# Patient Record
Sex: FEMALE | Race: WHITE | Employment: FULL TIME | URBAN - METROPOLITAN AREA
[De-identification: names, ages, dates, MRNs, and addresses within clinical notes are randomized per-mention and may not be internally consistent; named-entity substitution may affect disease eponyms.]

---

## 2017-02-15 ENCOUNTER — OFFICE VISIT (OUTPATIENT)
Dept: INTERNAL MEDICINE CLINIC | Age: 32
End: 2017-02-15

## 2017-02-15 VITALS
SYSTOLIC BLOOD PRESSURE: 123 MMHG | WEIGHT: 151 LBS | BODY MASS INDEX: 26.75 KG/M2 | DIASTOLIC BLOOD PRESSURE: 65 MMHG | OXYGEN SATURATION: 98 % | HEART RATE: 86 BPM | RESPIRATION RATE: 16 BRPM | TEMPERATURE: 99.3 F | HEIGHT: 63 IN

## 2017-02-15 DIAGNOSIS — J02.0 STREP THROAT: ICD-10-CM

## 2017-02-15 DIAGNOSIS — R09.81 SINUS CONGESTION: ICD-10-CM

## 2017-02-15 DIAGNOSIS — J02.9 SORE THROAT: Primary | ICD-10-CM

## 2017-02-15 LAB
S PYO AG THROAT QL: POSITIVE
VALID INTERNAL CONTROL?: YES

## 2017-02-15 RX ORDER — AMOXICILLIN 875 MG/1
875 TABLET, FILM COATED ORAL 2 TIMES DAILY
Qty: 20 TAB | Refills: 0 | Status: SHIPPED | OUTPATIENT
Start: 2017-02-15 | End: 2017-05-04 | Stop reason: ALTCHOICE

## 2017-02-15 RX ORDER — PSEUDOEPHEDRINE HCL 30 MG
30 TABLET ORAL
Qty: 30 TAB | Refills: 0 | Status: SHIPPED | OUTPATIENT
Start: 2017-02-15 | End: 2017-05-04 | Stop reason: ALTCHOICE

## 2017-02-15 NOTE — LETTER
NOTIFICATION RETURN TO WORK / SCHOOL 
 
2/15/2017 10:10 AM 
 
Ms. Foreman [address] [address] To Whom It May Concern: 
 
Sudha Hauser is currently under the care of [address] [place] [address],[address]. Was seen in office today and diagnosed with strep throat and is considered contagious until she is on antibiotic therapy for 24 hours. She will return to work/school on: 2/16/2017 If there are questions or concerns please have the patient contact our office.  
 
 
 
Sincerely, 
 
 
Galo Tapia NP

## 2017-02-15 NOTE — PATIENT INSTRUCTIONS
Strep Throat: Care Instructions  Your Care Instructions    Strep throat is a bacterial infection that causes sudden, severe sore throat and fever. Strep throat, which is caused by bacteria called streptococcus, is treated with antibiotics. Sometimes a strep test is necessary to tell if the sore throat is caused by strep bacteria. Treatment can help ease symptoms and may prevent future problems. Follow-up care is a key part of your treatment and safety. Be sure to make and go to all appointments, and call your doctor if you are having problems. It's also a good idea to know your test results and keep a list of the medicines you take. How can you care for yourself at home? · Take your antibiotics as directed. Do not stop taking them just because you feel better. You need to take the full course of antibiotics. · Strep throat can spread to others until 24 hours after you begin taking antibiotics. During this time, you should avoid contact with other people at work or home, especially infants and children. Do not sneeze or cough on others, and wash your hands often. Keep your drinking glass and eating utensils separate from those of others, and wash these items well in hot, soapy water. · Gargle with warm salt water at least once each hour to help reduce swelling and make your throat feel better. Use 1 teaspoon of salt mixed in 8 fluid ounces of warm water. · Take an over-the-counter pain medication, such as acetaminophen (Tylenol), ibuprofen (Advil, Motrin), or naproxen (Aleve). Read and follow all instructions on the label. · Try an over-the-counter anesthetic throat spray or throat lozenges, which may help relieve throat pain. · Drink plenty of fluids. Fluids may help soothe an irritated throat. Hot fluids, such as tea or soup, may help your throat feel better. · Eat soft solids and drink plenty of clear liquids.  Flavored ice pops, ice cream, scrambled eggs, sherbet, and gelatin dessert (such as Jell-O) may also soothe the throat. · Get lots of rest.  · Do not smoke, and avoid secondhand smoke. If you need help quitting, talk to your doctor about stop-smoking programs and medicines. These can increase your chances of quitting for good. · Use a vaporizer or humidifier to add moisture to the air in your bedroom. Follow the directions for cleaning the machine. When should you call for help? Call your doctor now or seek immediate medical care if:  · You have a new or higher fever. · You have a fever with a stiff neck or severe headache. · You have new or worse trouble swallowing. · Your sore throat gets much worse on one side. · Your pain becomes much worse on one side of your throat. Watch closely for changes in your health, and be sure to contact your doctor if:  · You are not getting better after 2 days (48 hours). · You do not get better as expected. Where can you learn more? Go to http://balbir-tosha.info/. Enter K625 in the search box to learn more about \"Strep Throat: Care Instructions. \"  Current as of: July 29, 2016  Content Version: 11.1  © 6516-5165 [employer]. Care instructions adapted under license by [employer] (which disclaims liability or warranty for this information). If you have questions about a medical condition or this instruction, always ask your healthcare professional. [address] any warranty or liability for your use of this information. Managing Your Allergies: Care Instructions  Your Care Instructions  Managing your allergies is an important part of staying healthy. Your doctor will help you find out what may be causing the allergies. Common causes of allergy symptoms are house dust and dust mites, animal dander, mold, and pollen. As soon as you know what triggers your symptoms, try to reduce your exposure to your triggers. This can help prevent allergy symptoms, asthma, and other health problems.   Ask your doctor about allergy medicine or immunotherapy. These treatments may help reduce or prevent allergy symptoms. Follow-up care is a key part of your treatment and safety. Be sure to make and go to all appointments, and call your doctor if you are having problems. It's also a good idea to know your test results and keep a list of the medicines you take. How can you care for yourself at home? · If you think that dust or dust mites are causing your allergies:  ¨ Wash sheets, pillowcases, and other bedding every week in hot water. ¨ Use airtight, dust-proof covers for pillows, duvets, and mattresses. Avoid plastic covers, because they tend to tear quickly and do not \"breathe. \" Wash according to the instructions. ¨ Remove extra blankets and pillows that you don't need. ¨ Use blankets that are machine-washable. ¨ Don't use home humidifiers. They can help mites live longer. · Use air-conditioning. Change or clean all filters every month. Keep windows closed. Use high-efficiency air filters. Don't use window or attic fans, which draw dust into the air. · If you're allergic to pet dander, keep pets outside or, at the very least, out of your bedroom. Old carpet and cloth-covered furniture can hold a lot of animal dander. You may need to replace them. · Look for signs of cockroaches. Use cockroach baits to get rid of them. Then clean your home well. · If you're allergic to mold, don't keep indoor plants, because molds can grow in soil. Get rid of furniture, rugs, and drapes that smell musty. Check for mold in the bathroom. · If you're allergic to pollen, stay inside when pollen counts are high. · Don't smoke or let anyone else smoke in your house. Don't use fireplaces or wood-burning stoves. Avoid paint fumes, perfumes, and other strong odors. When should you call for help? Give an epinephrine shot if:  · You think you are having a severe allergic reaction.   · You have symptoms in more than one body area, such as mild nausea and an itchy mouth. After giving an epinephrine shot call 911, even if you feel better. Call 911 if:  · You have symptoms of a severe allergic reaction. These may include:  ¨ Sudden raised, red areas (hives) all over your body. ¨ Swelling of the throat, mouth, lips, or tongue. ¨ Trouble breathing. ¨ Passing out (losing consciousness). Or you may feel very lightheaded or suddenly feel weak, confused, or restless. · You have been given an epinephrine shot, even if you feel better. Call your doctor now or seek immediate medical care if:  · You have symptoms of an allergic reaction, such as:  ¨ A rash or hives (raised, red areas on the skin). ¨ Itching. ¨ Swelling. ¨ Belly pain, nausea, or vomiting. Watch closely for changes in your health, and be sure to contact your doctor if:  · Your allergies get worse. · You need help controlling your allergies. · You have questions about allergy testing. · You do not get better as expected. Where can you learn more? Go to http://balbir-tosha.info/. Enter L249 in the search box to learn more about \"Managing Your Allergies: Care Instructions. \"  Current as of: February 12, 2016  Content Version: 11.1  © 3343-9404 [employer]. Care instructions adapted under license by [employer] (which disclaims liability or warranty for this information). If you have questions about a medical condition or this instruction, always ask your healthcare professional. [place] [address] any warranty or liability for your use of this information.

## 2017-02-15 NOTE — PROGRESS NOTES
Have you been to the ER or urgent care clinic since your last visit? No     Have you been hospitalized since your last visit? No     Have you been seen or consulted any other health care provider outside of [address] [place] [address] since your last visit (including pap smears, colonoscopy screening)?   No

## 2017-02-15 NOTE — PROGRESS NOTES
HISTORY OF PRESENT ILLNESS  Rubi Barroso is a 27 y.o. female. HPI  Presents with complaints of severe sore throat and fever for 2 days. Has had sinus congestion over the past week but denies purulent mucous, sinus pain, thick post nasal drainage. Has had some dry cough but denies shortness of breath or wheezing. She is weaning her 10 month old daughter from breast feeding. Her  tested positive for strep several days ago. Review of Systems   Constitutional: Positive for chills, fever and malaise/fatigue. HENT: Positive for congestion and sore throat. Respiratory: Positive for cough. Negative for sputum production and shortness of breath. Cardiovascular: Negative for chest pain and palpitations. Gastrointestinal: Negative for nausea and vomiting. Skin: Negative for rash. Neurological: Positive for headaches. Negative for dizziness and tingling. /65 (BP 1 Location: Left arm, BP Patient Position: Sitting)  Pulse 86  Temp 99.3 °F (37.4 °C) (Oral)   Resp 16  Ht 5' 3\" (1.6 m)  Wt 151 lb (68.5 kg)  SpO2 98%  BMI 26.75 kg/m2  Physical Exam   Constitutional: She is oriented to person, place, and time. She appears well-developed and well-nourished. HENT:   Head: Normocephalic and atraumatic. Right Ear: External ear normal.   Left Ear: External ear normal.   Nose: Nose normal.   Mouth/Throat: Posterior oropharyngeal edema and posterior oropharyngeal erythema present. Neck: Normal range of motion. Neck supple. No thyromegaly present. Cardiovascular: Normal rate and regular rhythm. Pulmonary/Chest: Effort normal and breath sounds normal. She has no wheezes. She has no rales. Lymphadenopathy:     She has cervical adenopathy. Neurological: She is alert and oriented to person, place, and time. Psychiatric: She has a normal mood and affect. Her behavior is normal.   Nursing note and vitals reviewed. ASSESSMENT and PLAN  Sherri Estes was seen today for sore throat.     Diagnoses and all orders for this visit:    Sore throat  -     AMB POC RAPID STREP A -- positive  -     benzocaine-menthol (CEPACOL SORE THROAT, CARLOS-MEN,) 15-3.6 mg lozg lozenge; 1 Lozenge by Mucous Membrane route as needed. Strep throat  -     amoxicillin (AMOXIL) 875 mg tablet; Take 1 Tab by mouth two (2) times a day. Sinus congestion  -     pseudoephedrine (SUDAFED) 30 mg tablet; Take 1 Tab by mouth every six (6) hours as needed for Congestion.       lab results and schedule of future lab studies reviewed with patient  reviewed diet, exercise and weight control  reviewed medications and side effects in detail

## 2017-02-15 NOTE — MR AVS SNAPSHOT
Visit Information Date & Time Provider Department Dept. Phone Encounter #  
 2/15/2017  9:40 AM Roslyn Kumar NP [facility] [address] [id number] Upcoming Health Maintenance Date Due DTaP/Tdap/Td series (1 - Tdap) 2/3/2004 PAP AKA CERVICAL CYTOLOGY 2/1/2016 INFLUENZA AGE 9 TO ADULT 8/1/2016 Allergies as of 2/15/2017  Review Complete On: 2/15/2017 By: Roslyn Kumar NP No Known Allergies Current Immunizations  Reviewed on 12/4/2015 Name Date Influenza Vaccine 10/15/2015 Not reviewed this visit You Were Diagnosed With   
  
 Codes Comments Sore throat    -  Primary ICD-10-CM: J02.9 ICD-9-CM: 497 Strep throat     ICD-10-CM: J02.0 ICD-9-CM: 034.0 Sinus congestion     ICD-10-CM: R09.81 ICD-9-CM: 478.19 Vitals BP Pulse Temp Resp Height(growth percentile) Weight(growth percentile) 123/65 (BP 1 Location: Left arm, BP Patient Position: Sitting) 86 99.3 °F (37.4 °C) (Oral) 16 5' 3\" (1.6 m) 151 lb (68.5 kg) SpO2 BMI OB Status Smoking Status 98% 26.75 kg/m2 Recent pregnancy Never Smoker BMI and BSA Data Body Mass Index Body Surface Area  
 26.75 kg/m 2 1.74 m 2 Preferred Pharmacy Pharmacy Name Phone [facility] [address] [place] [address] [phone] Your Updated Medication List  
  
   
This list is accurate as of: 2/15/17 10:12 AM.  Always use your most recent med list.  
  
  
  
  
 amoxicillin 875 mg tablet Commonly known as:  AMOXIL Take 1 Tab by mouth two (2) times a day. benzocaine-menthol 15-3.6 mg Lozg lozenge Commonly known as:  CEPACOL SORE THROAT (CARLOS-MEN) 1 Lozenge by Mucous Membrane route as needed. COLACE 50 mg capsule Generic drug:  docusate sodium Take 50 mg by mouth two (2) times a day. levonorgestrel 20 mcg/24 hr (5 years) IUD Commonly known as:  MIRENA  
 1 Each by IntraUTERine route once. PRENATAL DHA+COMPLETE PRENATAL -300 mg-mcg-mg Cmpk Generic drug:  PNV no.24-iron-folic acid-dha Take  by mouth.  
  
 pseudoephedrine 30 mg tablet Commonly known as:  SUDAFED Take 1 Tab by mouth every six (6) hours as needed for Congestion. Prescriptions Sent to Pharmacy Refills  
 amoxicillin (AMOXIL) 875 mg tablet 0 Sig: Take 1 Tab by mouth two (2) times a day. Class: Normal  
 Pharmacy: [address] [place] [address] Ph #: [phone] Route: Oral  
 pseudoephedrine (SUDAFED) 30 mg tablet 0 Sig: Take 1 Tab by mouth every six (6) hours as needed for Congestion. Class: Normal  
 Pharmacy: [address] Ph #: [phone] Route: Oral  
  
We Performed the Following AMB POC RAPID STREP A [68966 CPT(R)] Patient Instructions Strep Throat: Care Instructions Your Care Instructions Strep throat is a bacterial infection that causes sudden, severe sore throat and fever. Strep throat, which is caused by bacteria called streptococcus, is treated with antibiotics. Sometimes a strep test is necessary to tell if the sore throat is caused by strep bacteria. Treatment can help ease symptoms and may prevent future problems. Follow-up care is a key part of your treatment and safety. Be sure to make and go to all appointments, and call your doctor if you are having problems. It's also a good idea to know your test results and keep a list of the medicines you take. How can you care for yourself at home? · Take your antibiotics as directed. Do not stop taking them just because you feel better. You need to take the full course of antibiotics. · Strep throat can spread to others until 24 hours after you begin taking antibiotics.  During this time, you should avoid contact with other people at work or home, especially infants and children. Do not sneeze or cough on others, and wash your hands often. Keep your drinking glass and eating utensils separate from those of others, and wash these items well in hot, soapy water. · Gargle with warm salt water at least once each hour to help reduce swelling and make your throat feel better. Use 1 teaspoon of salt mixed in 8 fluid ounces of warm water. · Take an over-the-counter pain medication, such as acetaminophen (Tylenol), ibuprofen (Advil, Motrin), or naproxen (Aleve). Read and follow all instructions on the label. · Try an over-the-counter anesthetic throat spray or throat lozenges, which may help relieve throat pain. · Drink plenty of fluids. Fluids may help soothe an irritated throat. Hot fluids, such as tea or soup, may help your throat feel better. · Eat soft solids and drink plenty of clear liquids. Flavored ice pops, ice cream, scrambled eggs, sherbet, and gelatin dessert (such as Jell-O) may also soothe the throat. · Get lots of rest. 
· Do not smoke, and avoid secondhand smoke. If you need help quitting, talk to your doctor about stop-smoking programs and medicines. These can increase your chances of quitting for good. · Use a vaporizer or humidifier to add moisture to the air in your bedroom. Follow the directions for cleaning the machine. When should you call for help? Call your doctor now or seek immediate medical care if: 
· You have a new or higher fever. · You have a fever with a stiff neck or severe headache. · You have new or worse trouble swallowing. · Your sore throat gets much worse on one side. · Your pain becomes much worse on one side of your throat. Watch closely for changes in your health, and be sure to contact your doctor if: 
· You are not getting better after 2 days (48 hours). · You do not get better as expected. Where can you learn more? Go to http://balbir-tosha.info/. Enter K625 in the search box to learn more about \"Strep Throat: Care Instructions. \" Current as of: July 29, 2016 Content Version: 11.1 © 0670-3617 [employer]. Care instructions adapted under license by [employer] (which disclaims liability or warranty for this information). If you have questions about a medical condition or this instruction, always ask your healthcare professional. [address] any warranty or liability for your use of this information. Managing Your Allergies: Care Instructions Your Care Instructions Managing your allergies is an important part of staying healthy. Your doctor will help you find out what may be causing the allergies. Common causes of allergy symptoms are house dust and dust mites, animal dander, mold, and pollen. As soon as you know what triggers your symptoms, try to reduce your exposure to your triggers. This can help prevent allergy symptoms, asthma, and other health problems. Ask your doctor about allergy medicine or immunotherapy. These treatments may help reduce or prevent allergy symptoms. Follow-up care is a key part of your treatment and safety. Be sure to make and go to all appointments, and call your doctor if you are having problems. It's also a good idea to know your test results and keep a list of the medicines you take. How can you care for yourself at home? · If you think that dust or dust mites are causing your allergies: 
¨ Wash sheets, pillowcases, and other bedding every week in hot water. ¨ Use airtight, dust-proof covers for pillows, duvets, and mattresses. Avoid plastic covers, because they tend to tear quickly and do not \"breathe. \" Wash according to the instructions. ¨ Remove extra blankets and pillows that you don't need. ¨ Use blankets that are machine-washable. ¨ Don't use home humidifiers. They can help mites live longer. · Use air-conditioning. Change or clean all filters every month. Keep windows closed. Use high-efficiency air filters. Don't use window or attic fans, which draw dust into the air. · If you're allergic to pet dander, keep pets outside or, at the very least, out of your bedroom. Old carpet and cloth-covered furniture can hold a lot of animal dander. You may need to replace them. · Look for signs of cockroaches. Use cockroach baits to get rid of them. Then clean your home well. · If you're allergic to mold, don't keep indoor plants, because molds can grow in soil. Get rid of furniture, rugs, and drapes that smell musty. Check for mold in the bathroom. · If you're allergic to pollen, stay inside when pollen counts are high. · Don't smoke or let anyone else smoke in your house. Don't use fireplaces or wood-burning stoves. Avoid paint fumes, perfumes, and other strong odors. When should you call for help? Give an epinephrine shot if: 
· You think you are having a severe allergic reaction. · You have symptoms in more than one body area, such as mild nausea and an itchy mouth. After giving an epinephrine shot call 911, even if you feel better. Call 911 if: 
· You have symptoms of a severe allergic reaction. These may include: 
¨ Sudden raised, red areas (hives) all over your body. ¨ Swelling of the throat, mouth, lips, or tongue. ¨ Trouble breathing. ¨ Passing out (losing consciousness). Or you may feel very lightheaded or suddenly feel weak, confused, or restless. · You have been given an epinephrine shot, even if you feel better. Call your doctor now or seek immediate medical care if: 
· You have symptoms of an allergic reaction, such as: ¨ A rash or hives (raised, red areas on the skin). ¨ Itching. ¨ Swelling. ¨ Belly pain, nausea, or vomiting. Watch closely for changes in your health, and be sure to contact your doctor if: 
· Your allergies get worse. · You need help controlling your allergies. · You have questions about allergy testing. · You do not get better as expected. Where can you learn more? Go to http://balbir-tosha.info/. Enter L249 in the search box to learn more about \"Managing Your Allergies: Care Instructions. \" Current as of: February 12, 2016 Content Version: 11.1 © 2413-3940 [employer]. Care instructions adapted under license by [employer] (which disclaims liability or warranty for this information). If you have questions about a medical condition or this instruction, always ask your healthcare professional. [address] any warranty or liability for your use of this information. Introducing [facility]! Dear Anahy Barbosa: Thank you for requesting a Cavium account. Our records indicate that you already have an active Cavium account. You can access your account anytime at [email or web address] Did you know that you can access your hospital and ER discharge instructions at any time in Cavium? You can also review all of your test results from your hospital stay or ER visit. Additional Information If you have questions, please visit the Frequently Asked Questions section of the Cavium website at [email or web address] Remember, Cavium is NOT to be used for urgent needs. For medical emergencies, dial 911. Now available from your iPhone and Android! Please provide this summary of care documentation to your next provider. Your primary care clinician is listed as Joey De La Vega. If you have any questions after today's visit, please call [phone].

## 2017-05-04 ENCOUNTER — OFFICE VISIT (OUTPATIENT)
Dept: INTERNAL MEDICINE CLINIC | Age: 32
End: 2017-05-04

## 2017-05-04 VITALS
OXYGEN SATURATION: 98 % | TEMPERATURE: 98.5 F | DIASTOLIC BLOOD PRESSURE: 67 MMHG | HEART RATE: 59 BPM | RESPIRATION RATE: 18 BRPM | BODY MASS INDEX: 27.07 KG/M2 | HEIGHT: 63 IN | WEIGHT: 152.8 LBS | SYSTOLIC BLOOD PRESSURE: 98 MMHG

## 2017-05-04 DIAGNOSIS — J01.00 ACUTE MAXILLARY SINUSITIS, RECURRENCE NOT SPECIFIED: Primary | ICD-10-CM

## 2017-05-04 DIAGNOSIS — J30.1 SEASONAL ALLERGIC RHINITIS DUE TO POLLEN: ICD-10-CM

## 2017-05-04 DIAGNOSIS — G44.229 CHRONIC TENSION-TYPE HEADACHE, NOT INTRACTABLE: ICD-10-CM

## 2017-05-04 RX ORDER — FLUTICASONE PROPIONATE 50 MCG
2 SPRAY, SUSPENSION (ML) NASAL DAILY
Qty: 1 BOTTLE | Refills: 0
Start: 2017-05-04 | End: 2017-08-21

## 2017-05-04 RX ORDER — SUMATRIPTAN 50 MG/1
50 TABLET, FILM COATED ORAL
Qty: 12 TAB | Refills: 2 | Status: SHIPPED | OUTPATIENT
Start: 2017-05-04 | End: 2018-06-07 | Stop reason: SDUPTHER

## 2017-05-04 RX ORDER — CEFUROXIME AXETIL 500 MG/1
500 TABLET ORAL 2 TIMES DAILY
Qty: 20 TAB | Refills: 0 | Status: SHIPPED | OUTPATIENT
Start: 2017-05-04 | End: 2017-08-21

## 2017-05-04 NOTE — MR AVS SNAPSHOT
Visit Information Date & Time Provider Department Dept. Phone Encounter #  
 5/4/2017  2:00 PM Tash Orlando NP [facility] [address] [id number] Upcoming Health Maintenance Date Due DTaP/Tdap/Td series (1 - Tdap) 2/3/2004 PAP AKA CERVICAL CYTOLOGY 2/1/2016 INFLUENZA AGE 9 TO ADULT 8/1/2017 Allergies as of 5/4/2017  Review Complete On: 5/4/2017 By: Tash Orlando NP No Known Allergies Current Immunizations  Reviewed on 12/4/2015 Name Date Influenza Vaccine 10/15/2015 Not reviewed this visit You Were Diagnosed With   
  
 Codes Comments Acute maxillary sinusitis, recurrence not specified    -  Primary ICD-10-CM: J01.00 ICD-9-CM: 461.0 Chronic tension-type headache, not intractable     ICD-10-CM: B84.223 ICD-9-CM: 339.12 Seasonal allergic rhinitis due to pollen     ICD-10-CM: J30.1 ICD-9-CM: 477.0 Vitals BP Pulse Temp Resp Height(growth percentile) Weight(growth percentile) 98/67 (BP 1 Location: Left arm, BP Patient Position: Sitting) (!) 59 98.5 °F (36.9 °C) 18 5' 3\" (1.6 m) 152 lb 12.8 oz (69.3 kg) LMP SpO2 BMI OB Status Smoking Status 04/20/2017 (Exact Date) 98% 27.07 kg/m2 IUD Never Smoker Vitals History BMI and BSA Data Body Mass Index Body Surface Area  
 27.07 kg/m 2 1.76 m 2 Preferred Pharmacy Pharmacy Name Phone [facility] [address] [place] [address] [place] [facility] [phone] Your Updated Medication List  
  
   
This list is accurate as of: 5/4/17  2:38 PM.  Always use your most recent med list.  
  
  
  
  
 cefUROXime 500 mg tablet Commonly known as:  CEFTIN Take 1 Tab by mouth two (2) times a day. COLACE 50 mg capsule Generic drug:  docusate sodium Take 50 mg by mouth two (2) times a day. fluticasone 50 mcg/actuation nasal spray Commonly known as:  Kulwant Malcolm 2 Sprays by Both Nostrils route daily. levonorgestrel 20 mcg/24 hr (5 years) IUD Commonly known as:  MIRENA  
1 Each by IntraUTERine route once. PRENATAL DHA+COMPLETE PRENATAL -300 mg-mcg-mg Cmpk Generic drug:  PNV no.24-iron-folic acid-dha Take  by mouth. SUMAtriptan 50 mg tablet Commonly known as:  IMITREX Take 1 Tab by mouth once as needed for Migraine for up to 1 dose. May repeat in 2 hours with 2nd tablet; max 2 tabs in 24 hours Prescriptions Sent to Pharmacy Refills  
 cefUROXime (CEFTIN) 500 mg tablet 0 Sig: Take 1 Tab by mouth two (2) times a day. Class: Normal  
 Pharmacy: [address] [place] [address] Ph #: [phone] Route: Oral  
 SUMAtriptan (IMITREX) 50 mg tablet 2 Sig: Take 1 Tab by mouth once as needed for Migraine for up to 1 dose. May repeat in 2 hours with 2nd tablet; max 2 tabs in 24 hours Class: Normal  
 Pharmacy: [address] [place] [address] Ph #: [phone] Route: Oral  
  
Patient Instructions Seasonal Allergies: Care Instructions Your Care Instructions Allergies occur when your body's defense system (immune system) overreacts to certain substances. The immune system treats a harmless substance as if it were a harmful germ or virus. Many things can cause this to happen. Examples include pollens, medicine, food, dust, animal dander, and mold. Your allergies are seasonal if you have symptoms just at certain times of the year. In that case, you are probably allergic to pollens from certain trees, grasses, or weeds. Allergies can be mild or severe. Over-the-counter allergy medicine may help with some symptoms. Read and follow all instructions on the label. Managing your allergies is an important part of staying healthy. Your doctor may suggest that you have tests to help find the cause of your allergies.  When you know what things trigger your symptoms, you can avoid them. This can prevent allergy symptoms and other health problems. In some cases, immunotherapy might help. For this treatment, you get shots or use pills that have a small amount of certain allergens in them. Your body \"gets used to\" the allergen, so you react less to it over time. This kind of treatment may help prevent or reduce some allergy symptoms. Follow-up care is a key part of your treatment and safety. Be sure to make and go to all appointments, and call your doctor if you are having problems. It's also a good idea to know your test results and keep a list of the medicines you take. How can you care for yourself at home? · Be safe with medicines. Take your medicines exactly as prescribed. Call your doctor if you think you are having a problem with your medicine. · During your allergy season, keep windows closed. If you need to use air-conditioning, change or clean all filters every month. Take a shower and change your clothes after you have been outside. · Stay inside when pollen counts are high. Vacuum once or twice a week. Use a vacuum  with a HEPA filter or a double-thickness filter. When should you call for help? Call 911 anytime you think you may need emergency care. For example, call if: 
· You have symptoms of a severe allergic reaction. These may include: 
¨ Sudden raised, red areas (hives) all over your body. ¨ Swelling of the throat, mouth, lips, or tongue. ¨ Trouble breathing. ¨ Passing out (losing consciousness). Or you may feel very lightheaded or suddenly feel weak, confused, or restless. Watch closely for changes in your health, and be sure to contact your doctor if: 
· You need help controlling your allergies. · You have questions about allergy testing. · You do not get better as expected. Where can you learn more? Go to http://balbir-tosha.info/. Enter J912 in the search box to learn more about \"Seasonal Allergies: Care Instructions. \" 
 Current as of: February 12, 2016 Content Version: 11.2 © 9781-8929 [employer]. Care instructions adapted under license by [employer] (which disclaims liability or warranty for this information). If you have questions about a medical condition or this instruction, always ask your healthcare professional. [address] any warranty or liability for your use of this information. Sinusitis: Care Instructions Your Care Instructions Sinusitis is an infection of the lining of the sinus cavities in your head. Sinusitis often follows a cold. It causes pain and pressure in your head and face. In most cases, sinusitis gets better on its own in 1 to 2 weeks. But some mild symptoms may last for several weeks. Sometimes antibiotics are needed. Follow-up care is a key part of your treatment and safety. Be sure to make and go to all appointments, and call your doctor if you are having problems. It's also a good idea to know your test results and keep a list of the medicines you take. How can you care for yourself at home? · Take an over-the-counter pain medicine, such as acetaminophen (Tylenol), ibuprofen (Advil, Motrin), or naproxen (Aleve). Read and follow all instructions on the label. · If the doctor prescribed antibiotics, take them as directed. Do not stop taking them just because you feel better. You need to take the full course of antibiotics. · Be careful when taking over-the-counter cold or flu medicines and Tylenol at the same time. Many of these medicines have acetaminophen, which is Tylenol. Read the labels to make sure that you are not taking more than the recommended dose. Too much acetaminophen (Tylenol) can be harmful. · Breathe warm, moist air from a steamy shower, a hot bath, or a sink filled with hot water. Avoid cold, dry air. Using a humidifier in your home may help. Follow the directions for cleaning the machine. · Use saline (saltwater) nasal washes to help keep your nasal passages open and wash out mucus and bacteria. You can buy saline nose drops at a grocery store or drugstore. Or you can make your own at home by adding 1 teaspoon of salt and 1 teaspoon of baking soda to 2 cups of distilled water. If you make your own, fill a bulb syringe with the solution, insert the tip into your nostril, and squeeze gently. Shellie Conine your nose. · Put a hot, wet towel or a warm gel pack on your face 3 or 4 times a day for 5 to 10 minutes each time. · Try a decongestant nasal spray like oxymetazoline (Afrin). Do not use it for more than 3 days in a row. Using it for more than 3 days can make your congestion worse. When should you call for help? Call your doctor now or seek immediate medical care if: 
· You have new or worse swelling or redness in your face or around your eyes. · You have a new or higher fever. Watch closely for changes in your health, and be sure to contact your doctor if: 
· You have new or worse facial pain. · The mucus from your nose becomes thicker (like pus) or has new blood in it. · You are not getting better as expected. Where can you learn more? Go to http://balbir-tosha.info/. Enter D718 in the search box to learn more about \"Sinusitis: Care Instructions. \" Current as of: July 29, 2016 Content Version: 11.2 © 9491-8975 [employer]. Care instructions adapted under license by [employer] (which disclaims liability or warranty for this information). If you have questions about a medical condition or this instruction, always ask your healthcare professional. [place] [address] any warranty or liability for your use of this information. Recurring Migraine Headache: Care Instructions Your Care Instructions Migraines are painful, throbbing headaches.  They often start on one side of the head. They may cause nausea and vomiting and make you sensitive to light, sound, or smell. Some people may have only a few migraines throughout life. Others have them as often as several times a month. The goal of treatment is to reduce the number of migraines you have and relieve your symptoms. Even with treatment, you may continue to have migraines. You play an important role in dealing with your headaches. Work on avoiding things that seem to trigger your migraines. When you feel a headache coming on, act quickly to stop it before it gets worse. Follow-up care is a key part of your treatment and safety. Be sure to make and go to all appointments, and call your doctor if you are having problems. It's also a good idea to know your test results and keep a list of the medicines you take. How can you care for yourself at home? · Do not drive if you have taken a prescription pain medicine. · Rest in a quiet, dark room until your headache is gone. Close your eyes and try to relax or go to sleep. Do not watch TV or read. · Put a cold, moist cloth or cold pack on the painful area for 10 to 20 minutes at a time. Put a thin cloth between the cold pack and your skin. · Have someone gently massage your neck and shoulders. · Take your medicines exactly as prescribed. Call your doctor if you think you are having a problem with your medicine. You will get more details on the specific medicines your doctor prescribes. To prevent migraines · Keep a headache diary so you can figure out what triggers your headaches. Avoiding triggers may help you prevent headaches. Record when each headache began, how long it lasted, and what the pain was like. Use words like throbbing, aching, stabbing, or dull. Write down any other symptoms you had with the headache. These may include nausea, flashing lights or dark spots, or sensitivity to bright light or loud noise.  Note if the headache occurred near your period. List anything that might have triggered the headache. Triggers may include certain foods (chocolate, cheese, wine) or odors, smoke, bright light, stress, or lack of sleep. · If your doctor has prescribed medicine for your migraines, take it as directed. You may have medicine that you take only when you get a migraine and medicine that you take all the time to help prevent migraines. ¨ If your doctor has prescribed medicine for when you get a headache, take it at the first sign of a migraine, unless your doctor has given you other instructions. ¨ If your doctor has prescribed medicine to prevent migraines, take it exactly as prescribed. Call your doctor if you think you are having a problem with your medicine. · Find healthy ways to deal with stress. Migraines are most common during or right after stressful times. Take time to relax before and after you do something that has caused a migraine in the past. 
· Try to keep your muscles relaxed by keeping good posture. Check your jaw, face, neck, and shoulder muscles for tension. Try to relax them. When sitting at a desk, change positions often. Stretch for 30 seconds each hour. · Get regular sleep and exercise. · Eat regular meals, and avoid foods and drinks that often trigger migraines. These include chocolate and alcohol, especially red wine and port. Chemicals used in food, such as aspartame and monosodium glutamate (MSG), also can trigger migraines. So can some food additives, such as those found in hot dogs, seay, cold cuts, aged cheeses, and pickled foods. · Limit caffeine by not drinking too much coffee, tea, or soda. Do not quit caffeine suddenly, because that can also give you migraines. · Do not smoke or allow others to smoke around you. If you need help quitting, talk to your doctor about stop-smoking programs and medicines. These can increase your chances of quitting for good. · If you are taking birth control pills or hormone therapy, talk to your doctor about whether they are triggering your migraines. When should you call for help? Call 911 anytime you think you may need emergency care. For example, call if: 
· You have symptoms of a stroke. These may include: 
¨ Sudden numbness, tingling, weakness, or loss of movement in your face, arm, or leg, especially on only one side of your body. ¨ Sudden vision changes. ¨ Sudden trouble speaking. ¨ Sudden confusion or trouble understanding simple statements. ¨ Sudden problems with walking or balance. ¨ A sudden, severe headache that is different from past headaches. Call your doctor now or seek immediate medical care if: 
· You develop a fever and a stiff neck. · You have new nausea and vomiting, or you cannot keep down food or liquids. Watch closely for changes in your health, and be sure to contact your doctor if: 
· You have a headache that does not get better within 1 or 2 days. · Your headaches get worse or happen more often. Where can you learn more? Go to http://balbir-tosha.info/. Enter V975 in the search box to learn more about \"Recurring Migraine Headache: Care Instructions. \" Current as of: October 14, 2016 Content Version: 11.2 © 4861-7425 [employer]. Care instructions adapted under license by [employer] (which disclaims liability or warranty for this information). If you have questions about a medical condition or this instruction, always ask your healthcare professional. [place] [address] any warranty or liability for your use of this information. Introducing [facility]! Dear Marylene Plum: Thank you for requesting a [employer] account. Our records indicate that you already have an active [employer] account. You can access your account anytime at [email or web address] Did you know that you can access your hospital and ER discharge instructions at any time in [employer]? You can also review all of your test results from your hospital stay or ER visit. Additional Information If you have questions, please visit the Frequently Asked Questions section of the [employer] website at [email or web address] Remember, [employer] is NOT to be used for urgent needs. For medical emergencies, dial 911. Now available from your iPhone and Android! Please provide this summary of care documentation to your next provider. Your primary care clinician is listed as Shasha De La O. If you have any questions after today's visit, please call [phone].

## 2017-05-04 NOTE — PATIENT INSTRUCTIONS
Seasonal Allergies: Care Instructions  Your Care Instructions  Allergies occur when your body's defense system (immune system) overreacts to certain substances. The immune system treats a harmless substance as if it were a harmful germ or virus. Many things can cause this to happen. Examples include pollens, medicine, food, dust, animal dander, and mold. Your allergies are seasonal if you have symptoms just at certain times of the year. In that case, you are probably allergic to pollens from certain trees, grasses, or weeds. Allergies can be mild or severe. Over-the-counter allergy medicine may help with some symptoms. Read and follow all instructions on the label. Managing your allergies is an important part of staying healthy. Your doctor may suggest that you have tests to help find the cause of your allergies. When you know what things trigger your symptoms, you can avoid them. This can prevent allergy symptoms and other health problems. In some cases, immunotherapy might help. For this treatment, you get shots or use pills that have a small amount of certain allergens in them. Your body \"gets used to\" the allergen, so you react less to it over time. This kind of treatment may help prevent or reduce some allergy symptoms. Follow-up care is a key part of your treatment and safety. Be sure to make and go to all appointments, and call your doctor if you are having problems. It's also a good idea to know your test results and keep a list of the medicines you take. How can you care for yourself at home? · Be safe with medicines. Take your medicines exactly as prescribed. Call your doctor if you think you are having a problem with your medicine. · During your allergy season, keep windows closed. If you need to use air-conditioning, change or clean all filters every month. Take a shower and change your clothes after you have been outside. · Stay inside when pollen counts are high.  Vacuum once or twice a week. Use a vacuum  with a HEPA filter or a double-thickness filter. When should you call for help? Call 911 anytime you think you may need emergency care. For example, call if:  · You have symptoms of a severe allergic reaction. These may include:  ¨ Sudden raised, red areas (hives) all over your body. ¨ Swelling of the throat, mouth, lips, or tongue. ¨ Trouble breathing. ¨ Passing out (losing consciousness). Or you may feel very lightheaded or suddenly feel weak, confused, or restless. Watch closely for changes in your health, and be sure to contact your doctor if:  · You need help controlling your allergies. · You have questions about allergy testing. · You do not get better as expected. Where can you learn more? Go to http://balbirEtopustosha.info/. Enter J912 in the search box to learn more about \"Seasonal Allergies: Care Instructions. \"  Current as of: February 12, 2016  Content Version: 11.2  © 3264-7722 [employer]. Care instructions adapted under license by [employer] (which disclaims liability or warranty for this information). If you have questions about a medical condition or this instruction, always ask your healthcare professional. [place] [address] any warranty or liability for your use of this information. Sinusitis: Care Instructions  Your Care Instructions    Sinusitis is an infection of the lining of the sinus cavities in your head. Sinusitis often follows a cold. It causes pain and pressure in your head and face. In most cases, sinusitis gets better on its own in 1 to 2 weeks. But some mild symptoms may last for several weeks. Sometimes antibiotics are needed. Follow-up care is a key part of your treatment and safety. Be sure to make and go to all appointments, and call your doctor if you are having problems. It's also a good idea to know your test results and keep a list of the medicines you take.   How can you care for yourself at home? · Take an over-the-counter pain medicine, such as acetaminophen (Tylenol), ibuprofen (Advil, Motrin), or naproxen (Aleve). Read and follow all instructions on the label. · If the doctor prescribed antibiotics, take them as directed. Do not stop taking them just because you feel better. You need to take the full course of antibiotics. · Be careful when taking over-the-counter cold or flu medicines and Tylenol at the same time. Many of these medicines have acetaminophen, which is Tylenol. Read the labels to make sure that you are not taking more than the recommended dose. Too much acetaminophen (Tylenol) can be harmful. · Breathe warm, moist air from a steamy shower, a hot bath, or a sink filled with hot water. Avoid cold, dry air. Using a humidifier in your home may help. Follow the directions for cleaning the machine. · Use saline (saltwater) nasal washes to help keep your nasal passages open and wash out mucus and bacteria. You can buy saline nose drops at a grocery store or drugstore. Or you can make your own at home by adding 1 teaspoon of salt and 1 teaspoon of baking soda to 2 cups of distilled water. If you make your own, fill a bulb syringe with the solution, insert the tip into your nostril, and squeeze gently. Shreya Snider your nose. · Put a hot, wet towel or a warm gel pack on your face 3 or 4 times a day for 5 to 10 minutes each time. · Try a decongestant nasal spray like oxymetazoline (Afrin). Do not use it for more than 3 days in a row. Using it for more than 3 days can make your congestion worse. When should you call for help? Call your doctor now or seek immediate medical care if:  · You have new or worse swelling or redness in your face or around your eyes. · You have a new or higher fever. Watch closely for changes in your health, and be sure to contact your doctor if:  · You have new or worse facial pain.   · The mucus from your nose becomes thicker (like pus) or has new blood in it. · You are not getting better as expected. Where can you learn more? Go to http://balbir-tosha.info/. Enter S824 in the search box to learn more about \"Sinusitis: Care Instructions. \"  Current as of: July 29, 2016  Content Version: 11.2  © 8943-7958 [employer]. Care instructions adapted under license by [employer] (which disclaims liability or warranty for this information). If you have questions about a medical condition or this instruction, always ask your healthcare professional. [place] [address] any warranty or liability for your use of this information. Recurring Migraine Headache: Care Instructions  Your Care Instructions  Migraines are painful, throbbing headaches. They often start on one side of the head. They may cause nausea and vomiting and make you sensitive to light, sound, or smell. Some people may have only a few migraines throughout life. Others have them as often as several times a month. The goal of treatment is to reduce the number of migraines you have and relieve your symptoms. Even with treatment, you may continue to have migraines. You play an important role in dealing with your headaches. Work on avoiding things that seem to trigger your migraines. When you feel a headache coming on, act quickly to stop it before it gets worse. Follow-up care is a key part of your treatment and safety. Be sure to make and go to all appointments, and call your doctor if you are having problems. It's also a good idea to know your test results and keep a list of the medicines you take. How can you care for yourself at home? · Do not drive if you have taken a prescription pain medicine. · Rest in a quiet, dark room until your headache is gone. Close your eyes and try to relax or go to sleep. Do not watch TV or read. · Put a cold, moist cloth or cold pack on the painful area for 10 to 20 minutes at a time.  Put a thin cloth between the cold pack and your skin. · Have someone gently massage your neck and shoulders. · Take your medicines exactly as prescribed. Call your doctor if you think you are having a problem with your medicine. You will get more details on the specific medicines your doctor prescribes. To prevent migraines  · Keep a headache diary so you can figure out what triggers your headaches. Avoiding triggers may help you prevent headaches. Record when each headache began, how long it lasted, and what the pain was like. Use words like throbbing, aching, stabbing, or dull. Write down any other symptoms you had with the headache. These may include nausea, flashing lights or dark spots, or sensitivity to bright light or loud noise. Note if the headache occurred near your period. List anything that might have triggered the headache. Triggers may include certain foods (chocolate, cheese, wine) or odors, smoke, bright light, stress, or lack of sleep. · If your doctor has prescribed medicine for your migraines, take it as directed. You may have medicine that you take only when you get a migraine and medicine that you take all the time to help prevent migraines. ¨ If your doctor has prescribed medicine for when you get a headache, take it at the first sign of a migraine, unless your doctor has given you other instructions. ¨ If your doctor has prescribed medicine to prevent migraines, take it exactly as prescribed. Call your doctor if you think you are having a problem with your medicine. · Find healthy ways to deal with stress. Migraines are most common during or right after stressful times. Take time to relax before and after you do something that has caused a migraine in the past.  · Try to keep your muscles relaxed by keeping good posture. Check your jaw, face, neck, and shoulder muscles for tension. Try to relax them. When sitting at a desk, change positions often. Stretch for 30 seconds each hour.   · Get regular sleep and exercise. · Eat regular meals, and avoid foods and drinks that often trigger migraines. These include chocolate and alcohol, especially red wine and port. Chemicals used in food, such as aspartame and monosodium glutamate (MSG), also can trigger migraines. So can some food additives, such as those found in hot dogs, seay, cold cuts, aged cheeses, and pickled foods. · Limit caffeine by not drinking too much coffee, tea, or soda. Do not quit caffeine suddenly, because that can also give you migraines. · Do not smoke or allow others to smoke around you. If you need help quitting, talk to your doctor about stop-smoking programs and medicines. These can increase your chances of quitting for good. · If you are taking birth control pills or hormone therapy, talk to your doctor about whether they are triggering your migraines. When should you call for help? Call 911 anytime you think you may need emergency care. For example, call if:  · You have symptoms of a stroke. These may include:  ¨ Sudden numbness, tingling, weakness, or loss of movement in your face, arm, or leg, especially on only one side of your body. ¨ Sudden vision changes. ¨ Sudden trouble speaking. ¨ Sudden confusion or trouble understanding simple statements. ¨ Sudden problems with walking or balance. ¨ A sudden, severe headache that is different from past headaches. Call your doctor now or seek immediate medical care if:  · You develop a fever and a stiff neck. · You have new nausea and vomiting, or you cannot keep down food or liquids. Watch closely for changes in your health, and be sure to contact your doctor if:  · You have a headache that does not get better within 1 or 2 days. · Your headaches get worse or happen more often. Where can you learn more? Go to http://balbir-tosha.info/. Enter V975 in the search box to learn more about \"Recurring Migraine Headache: Care Instructions. \"  Current as of: October 14, 2016  Content Version: 11.2  © 7306-5724 [employer]. Care instructions adapted under license by [employer] (which disclaims liability or warranty for this information). If you have questions about a medical condition or this instruction, always ask your healthcare professional. [address] any warranty or liability for your use of this information.

## 2017-05-05 NOTE — PROGRESS NOTES
HISTORY OF PRESENT ILLNESS  Aden Moritz is a 27 y.o. female. HPI  Presents with complaints of headache that has been intermittent for the past 2 weeks. Began like migraine headache with pain over left eye and some light sensitivity and slight nausea but she started to develop more sinus and nasal congestion and eyes have been watery. She has developed thick greenish nasal drainage and has been having a lot of post nasal drainage. Hx of migraine headaches during her pregnancy and she hoped that these would ease after she delivered but she has noted more frequent headaches recently and attributes some of this to allergy symptoms as well. Denies fever, chills, cough. Has been using Advil and Excedrin for headaches with some relief. Review of Systems   Constitutional: Positive for malaise/fatigue. Negative for chills and fever. HENT: Positive for congestion. Eyes: Positive for photophobia. Respiratory: Negative for cough, sputum production and shortness of breath. Cardiovascular: Negative for chest pain and palpitations. Gastrointestinal: Negative for nausea and vomiting. Genitourinary: Negative for dysuria and frequency. Musculoskeletal: Negative for myalgias. Skin: Negative for rash. Neurological: Positive for headaches. Negative for dizziness and tingling. BP 98/67 (BP 1 Location: Left arm, BP Patient Position: Sitting)  Pulse (!) 59  Temp 98.5 °F (36.9 °C)  Resp 18  Ht 5' 3\" (1.6 m)  Wt 152 lb 12.8 oz (69.3 kg)  LMP 04/20/2017 (Exact Date)  SpO2 98%  BMI 27.07 kg/m2  Physical Exam   Constitutional: She is oriented to person, place, and time. She appears well-developed and well-nourished. HENT:   Head: Normocephalic and atraumatic. Right Ear: External ear normal.   Left Ear: External ear normal.   Nose: Mucosal edema present. Right sinus exhibits maxillary sinus tenderness. Left sinus exhibits maxillary sinus tenderness.    Mouth/Throat: Posterior oropharyngeal erythema present. Neck: Normal range of motion. Neck supple. No thyromegaly present. Cardiovascular: Normal rate and regular rhythm. Pulmonary/Chest: Effort normal and breath sounds normal. She has no wheezes. Lymphadenopathy:     She has no cervical adenopathy. Neurological: She is alert and oriented to person, place, and time. Skin: Skin is warm and dry. Psychiatric: She has a normal mood and affect. Her behavior is normal.   Nursing note and vitals reviewed. ASSESSMENT and PLAN  Roslyn Copeland was seen today for headache and nasal congestion. Diagnoses and all orders for this visit:    Acute maxillary sinusitis, recurrence not specified  -     cefUROXime (CEFTIN) 500 mg tablet; Take 1 Tab by mouth two (2) times a day. Chronic tension-type headache, not intractable  -     SUMAtriptan (IMITREX) 50 mg tablet; Take 1 Tab by mouth once as needed for Migraine for up to 1 dose. May repeat in 2 hours with 2nd tablet; max 2 tabs in 24 hours    Seasonal allergic rhinitis due to pollen  -     fluticasone (FLONASE) 50 mcg/actuation nasal spray; 2 Sprays by Both Nostrils route daily.       lab results and schedule of future lab studies reviewed with patient  reviewed diet, exercise and weight control  reviewed medications and side effects in detail

## 2017-08-21 ENCOUNTER — ANESTHESIA EVENT (OUTPATIENT)
Dept: SURGERY | Age: 32
End: 2017-08-21
Payer: COMMERCIAL

## 2017-08-21 ENCOUNTER — ANESTHESIA (OUTPATIENT)
Dept: SURGERY | Age: 32
End: 2017-08-21
Payer: COMMERCIAL

## 2017-08-21 ENCOUNTER — HOSPITAL ENCOUNTER (OUTPATIENT)
Age: 32
Setting detail: OBSERVATION
Discharge: HOME | End: 2017-08-22
Attending: EMERGENCY MEDICINE | Admitting: SURGERY
Payer: COMMERCIAL

## 2017-08-21 ENCOUNTER — APPOINTMENT (OUTPATIENT)
Dept: CT IMAGING | Age: 32
End: 2017-08-21
Attending: PHYSICIAN ASSISTANT
Payer: COMMERCIAL

## 2017-08-21 DIAGNOSIS — K35.80 ACUTE APPENDICITIS, UNSPECIFIED ACUTE APPENDICITIS TYPE: Primary | ICD-10-CM

## 2017-08-21 PROBLEM — K37 APPENDICITIS: Status: ACTIVE | Noted: 2017-08-21

## 2017-08-21 LAB
ALBUMIN SERPL-MCNC: 3.8 G/DL (ref 3.5–5)
ALBUMIN/GLOB SERPL: 1 {RATIO} (ref 1.1–2.2)
ALP SERPL-CCNC: 65 U/L (ref 45–117)
ALT SERPL-CCNC: 19 U/L (ref 12–78)
ANION GAP SERPL CALC-SCNC: 8 MMOL/L (ref 5–15)
APPEARANCE UR: ABNORMAL
AST SERPL-CCNC: 11 U/L (ref 15–37)
BACTERIA URNS QL MICRO: ABNORMAL /HPF
BASOPHILS # BLD: 0 K/UL (ref 0–0.1)
BASOPHILS NFR BLD: 0 % (ref 0–1)
BILIRUB SERPL-MCNC: 1.2 MG/DL (ref 0.2–1)
BILIRUB UR QL: NEGATIVE
BUN SERPL-MCNC: 11 MG/DL (ref 6–20)
BUN/CREAT SERPL: 9 (ref 12–20)
CALCIUM SERPL-MCNC: 8.4 MG/DL (ref 8.5–10.1)
CHLORIDE SERPL-SCNC: 100 MMOL/L (ref 97–108)
CO2 SERPL-SCNC: 26 MMOL/L (ref 21–32)
COLOR UR: ABNORMAL
CREAT SERPL-MCNC: 1.19 MG/DL (ref 0.55–1.02)
EOSINOPHIL # BLD: 0 K/UL (ref 0–0.4)
EOSINOPHIL NFR BLD: 0 % (ref 0–7)
EPITH CASTS URNS QL MICRO: ABNORMAL /LPF
ERYTHROCYTE [DISTWIDTH] IN BLOOD BY AUTOMATED COUNT: 13.7 % (ref 11.5–14.5)
GLOBULIN SER CALC-MCNC: 3.7 G/DL (ref 2–4)
GLUCOSE SERPL-MCNC: 194 MG/DL (ref 65–100)
GLUCOSE UR STRIP.AUTO-MCNC: 100 MG/DL
HCG UR QL: NEGATIVE
HCT VFR BLD AUTO: 44.5 % (ref 35–47)
HGB BLD-MCNC: 15.2 G/DL (ref 11.5–16)
HGB UR QL STRIP: NEGATIVE
KETONES UR QL STRIP.AUTO: NEGATIVE MG/DL
LEUKOCYTE ESTERASE UR QL STRIP.AUTO: NEGATIVE
LIPASE SERPL-CCNC: 145 U/L (ref 73–393)
LYMPHOCYTES # BLD: 1.2 K/UL (ref 0.8–3.5)
LYMPHOCYTES NFR BLD: 6 % (ref 12–49)
MCH RBC QN AUTO: 30.2 PG (ref 26–34)
MCHC RBC AUTO-ENTMCNC: 34.2 G/DL (ref 30–36.5)
MCV RBC AUTO: 88.3 FL (ref 80–99)
MONOCYTES # BLD: 1.2 K/UL (ref 0–1)
MONOCYTES NFR BLD: 6 % (ref 5–13)
NEUTS SEG # BLD: 16.2 K/UL (ref 1.8–8)
NEUTS SEG NFR BLD: 88 % (ref 32–75)
NITRITE UR QL STRIP.AUTO: NEGATIVE
PH UR STRIP: 6 [PH] (ref 5–8)
PLATELET # BLD AUTO: 203 K/UL (ref 150–400)
POTASSIUM SERPL-SCNC: 3.2 MMOL/L (ref 3.5–5.1)
PROT SERPL-MCNC: 7.5 G/DL (ref 6.4–8.2)
PROT UR STRIP-MCNC: ABNORMAL MG/DL
RBC # BLD AUTO: 5.04 M/UL (ref 3.8–5.2)
RBC #/AREA URNS HPF: ABNORMAL /HPF (ref 0–5)
SODIUM SERPL-SCNC: 134 MMOL/L (ref 136–145)
SP GR UR REFRACTOMETRY: 1.03 (ref 1–1.03)
UA: UC IF INDICATED,UAUC: ABNORMAL
UROBILINOGEN UR QL STRIP.AUTO: 0.2 EU/DL (ref 0.2–1)
WBC # BLD AUTO: 18.6 K/UL (ref 3.6–11)
WBC URNS QL MICRO: ABNORMAL /HPF (ref 0–4)

## 2017-08-21 PROCEDURE — 36415 COLL VENOUS BLD VENIPUNCTURE: CPT | Performed by: PHYSICIAN ASSISTANT

## 2017-08-21 PROCEDURE — 77030002966 HC SUT PDS J&J -A: Performed by: SURGERY

## 2017-08-21 PROCEDURE — 77030010507 HC ADH SKN DERMBND J&J -B: Performed by: SURGERY

## 2017-08-21 PROCEDURE — 77030022952 HC TRCR ENDOSC COVD -C: Performed by: SURGERY

## 2017-08-21 PROCEDURE — 96375 TX/PRO/DX INJ NEW DRUG ADDON: CPT

## 2017-08-21 PROCEDURE — 74011250636 HC RX REV CODE- 250/636: Performed by: SURGERY

## 2017-08-21 PROCEDURE — 74011250636 HC RX REV CODE- 250/636: Performed by: PHYSICIAN ASSISTANT

## 2017-08-21 PROCEDURE — 87086 URINE CULTURE/COLONY COUNT: CPT | Performed by: PHYSICIAN ASSISTANT

## 2017-08-21 PROCEDURE — 76010000138 HC OR TIME 0.5 TO 1 HR: Performed by: SURGERY

## 2017-08-21 PROCEDURE — 77030032490 HC SLV COMPR SCD KNE COVD -B: Performed by: SURGERY

## 2017-08-21 PROCEDURE — 99218 HC RM OBSERVATION: CPT

## 2017-08-21 PROCEDURE — 77030020747 HC TU INSUF ENDOSC TELE -A: Performed by: SURGERY

## 2017-08-21 PROCEDURE — 77030013079 HC BLNKT BAIR HGGR 3M -A: Performed by: ANESTHESIOLOGY

## 2017-08-21 PROCEDURE — 88304 TISSUE EXAM BY PATHOLOGIST: CPT | Performed by: SURGERY

## 2017-08-21 PROCEDURE — 96374 THER/PROPH/DIAG INJ IV PUSH: CPT

## 2017-08-21 PROCEDURE — 77030007955 HC PCH ENDOSC SPEC J&J -B: Performed by: SURGERY

## 2017-08-21 PROCEDURE — 77030019908 HC STETH ESOPH SIMS -A: Performed by: ANESTHESIOLOGY

## 2017-08-21 PROCEDURE — 74011000250 HC RX REV CODE- 250: Performed by: SURGERY

## 2017-08-21 PROCEDURE — 77030009965 HC RELD STPLR ENDOS COVD -D: Performed by: SURGERY

## 2017-08-21 PROCEDURE — 77030027138 HC INCENT SPIROMETER -A

## 2017-08-21 PROCEDURE — 74177 CT ABD & PELVIS W/CONTRAST: CPT

## 2017-08-21 PROCEDURE — 77030008684 HC TU ET CUF COVD -B: Performed by: ANESTHESIOLOGY

## 2017-08-21 PROCEDURE — 74011000258 HC RX REV CODE- 258: Performed by: SURGERY

## 2017-08-21 PROCEDURE — 77030035051: Performed by: SURGERY

## 2017-08-21 PROCEDURE — 80053 COMPREHEN METABOLIC PANEL: CPT | Performed by: PHYSICIAN ASSISTANT

## 2017-08-21 PROCEDURE — 76210000006 HC OR PH I REC 0.5 TO 1 HR: Performed by: SURGERY

## 2017-08-21 PROCEDURE — 74011636320 HC RX REV CODE- 636/320: Performed by: RADIOLOGY

## 2017-08-21 PROCEDURE — 77030018836 HC SOL IRR NACL ICUM -A: Performed by: SURGERY

## 2017-08-21 PROCEDURE — 74011250636 HC RX REV CODE- 250/636

## 2017-08-21 PROCEDURE — 99285 EMERGENCY DEPT VISIT HI MDM: CPT

## 2017-08-21 PROCEDURE — 81001 URINALYSIS AUTO W/SCOPE: CPT | Performed by: PHYSICIAN ASSISTANT

## 2017-08-21 PROCEDURE — 74011000250 HC RX REV CODE- 250

## 2017-08-21 PROCEDURE — 77030012029 HC APPL CLP LIG COVD -C: Performed by: SURGERY

## 2017-08-21 PROCEDURE — 83690 ASSAY OF LIPASE: CPT | Performed by: PHYSICIAN ASSISTANT

## 2017-08-21 PROCEDURE — 77030011640 HC PAD GRND REM COVD -A: Performed by: SURGERY

## 2017-08-21 PROCEDURE — 77030002933 HC SUT MCRYL J&J -A: Performed by: SURGERY

## 2017-08-21 PROCEDURE — 96361 HYDRATE IV INFUSION ADD-ON: CPT

## 2017-08-21 PROCEDURE — 77030022473 HC HNDL ENDO GIA UNIV USDA -C: Performed by: SURGERY

## 2017-08-21 PROCEDURE — 77030035048 HC TRCR ENDOSC OPTCL COVD -B: Performed by: SURGERY

## 2017-08-21 PROCEDURE — 85025 COMPLETE CBC W/AUTO DIFF WBC: CPT | Performed by: PHYSICIAN ASSISTANT

## 2017-08-21 PROCEDURE — 81025 URINE PREGNANCY TEST: CPT

## 2017-08-21 PROCEDURE — 77030026438 HC STYL ET INTUB CARD -A: Performed by: ANESTHESIOLOGY

## 2017-08-21 PROCEDURE — 76060000032 HC ANESTHESIA 0.5 TO 1 HR: Performed by: SURGERY

## 2017-08-21 RX ORDER — SODIUM CHLORIDE, SODIUM LACTATE, POTASSIUM CHLORIDE, CALCIUM CHLORIDE 600; 310; 30; 20 MG/100ML; MG/100ML; MG/100ML; MG/100ML
INJECTION, SOLUTION INTRAVENOUS
Status: DISCONTINUED | OUTPATIENT
Start: 2017-08-21 | End: 2017-08-21 | Stop reason: HOSPADM

## 2017-08-21 RX ORDER — SODIUM CHLORIDE 0.9 % (FLUSH) 0.9 %
5-10 SYRINGE (ML) INJECTION EVERY 8 HOURS
Status: DISCONTINUED | OUTPATIENT
Start: 2017-08-22 | End: 2017-08-22 | Stop reason: HOSPADM

## 2017-08-21 RX ORDER — LIDOCAINE HYDROCHLORIDE 10 MG/ML
0.1 INJECTION, SOLUTION EPIDURAL; INFILTRATION; INTRACAUDAL; PERINEURAL AS NEEDED
Status: DISCONTINUED | OUTPATIENT
Start: 2017-08-21 | End: 2017-08-21 | Stop reason: HOSPADM

## 2017-08-21 RX ORDER — ONDANSETRON 2 MG/ML
4 INJECTION INTRAMUSCULAR; INTRAVENOUS AS NEEDED
Status: DISCONTINUED | OUTPATIENT
Start: 2017-08-21 | End: 2017-08-21 | Stop reason: HOSPADM

## 2017-08-21 RX ORDER — SODIUM CHLORIDE, SODIUM LACTATE, POTASSIUM CHLORIDE, CALCIUM CHLORIDE 600; 310; 30; 20 MG/100ML; MG/100ML; MG/100ML; MG/100ML
125 INJECTION, SOLUTION INTRAVENOUS CONTINUOUS
Status: DISCONTINUED | OUTPATIENT
Start: 2017-08-21 | End: 2017-08-21 | Stop reason: HOSPADM

## 2017-08-21 RX ORDER — ONDANSETRON 2 MG/ML
4 INJECTION INTRAMUSCULAR; INTRAVENOUS
Status: DISCONTINUED | OUTPATIENT
Start: 2017-08-21 | End: 2017-08-22 | Stop reason: HOSPADM

## 2017-08-21 RX ORDER — HYDROMORPHONE HYDROCHLORIDE 1 MG/ML
.5-1 INJECTION, SOLUTION INTRAMUSCULAR; INTRAVENOUS; SUBCUTANEOUS
Status: DISCONTINUED | OUTPATIENT
Start: 2017-08-21 | End: 2017-08-21 | Stop reason: HOSPADM

## 2017-08-21 RX ORDER — DIPHENHYDRAMINE HYDROCHLORIDE 50 MG/ML
12.5 INJECTION, SOLUTION INTRAMUSCULAR; INTRAVENOUS
Status: DISCONTINUED | OUTPATIENT
Start: 2017-08-21 | End: 2017-08-22 | Stop reason: HOSPADM

## 2017-08-21 RX ORDER — SODIUM CHLORIDE 0.9 % (FLUSH) 0.9 %
5-10 SYRINGE (ML) INJECTION AS NEEDED
Status: DISCONTINUED | OUTPATIENT
Start: 2017-08-21 | End: 2017-08-22 | Stop reason: HOSPADM

## 2017-08-21 RX ORDER — DEXTROSE, SODIUM CHLORIDE, AND POTASSIUM CHLORIDE 5; .9; .15 G/100ML; G/100ML; G/100ML
100 INJECTION INTRAVENOUS CONTINUOUS
Status: DISCONTINUED | OUTPATIENT
Start: 2017-08-22 | End: 2017-08-22 | Stop reason: HOSPADM

## 2017-08-21 RX ORDER — SODIUM CHLORIDE 9 MG/ML
1000 INJECTION, SOLUTION INTRAVENOUS ONCE
Status: COMPLETED | OUTPATIENT
Start: 2017-08-21 | End: 2017-08-21

## 2017-08-21 RX ORDER — SODIUM CHLORIDE 0.9 % (FLUSH) 0.9 %
5-10 SYRINGE (ML) INJECTION EVERY 8 HOURS
Status: DISCONTINUED | OUTPATIENT
Start: 2017-08-21 | End: 2017-08-21 | Stop reason: HOSPADM

## 2017-08-21 RX ORDER — HYDROMORPHONE HYDROCHLORIDE 1 MG/ML
0.5 INJECTION, SOLUTION INTRAMUSCULAR; INTRAVENOUS; SUBCUTANEOUS
Status: DISCONTINUED | OUTPATIENT
Start: 2017-08-21 | End: 2017-08-22 | Stop reason: HOSPADM

## 2017-08-21 RX ORDER — SODIUM CHLORIDE 0.9 % (FLUSH) 0.9 %
5-10 SYRINGE (ML) INJECTION AS NEEDED
Status: DISCONTINUED | OUTPATIENT
Start: 2017-08-21 | End: 2017-08-21 | Stop reason: HOSPADM

## 2017-08-21 RX ORDER — ONDANSETRON 2 MG/ML
4 INJECTION INTRAMUSCULAR; INTRAVENOUS
Status: COMPLETED | OUTPATIENT
Start: 2017-08-21 | End: 2017-08-21

## 2017-08-21 RX ORDER — SUCCINYLCHOLINE CHLORIDE 20 MG/ML
INJECTION INTRAMUSCULAR; INTRAVENOUS AS NEEDED
Status: DISCONTINUED | OUTPATIENT
Start: 2017-08-21 | End: 2017-08-21 | Stop reason: HOSPADM

## 2017-08-21 RX ORDER — MORPHINE SULFATE 4 MG/ML
4 INJECTION, SOLUTION INTRAMUSCULAR; INTRAVENOUS
Status: COMPLETED | OUTPATIENT
Start: 2017-08-21 | End: 2017-08-21

## 2017-08-21 RX ORDER — LIDOCAINE HYDROCHLORIDE 20 MG/ML
INJECTION, SOLUTION EPIDURAL; INFILTRATION; INTRACAUDAL; PERINEURAL AS NEEDED
Status: DISCONTINUED | OUTPATIENT
Start: 2017-08-21 | End: 2017-08-21 | Stop reason: HOSPADM

## 2017-08-21 RX ORDER — GLYCOPYRROLATE 0.2 MG/ML
INJECTION INTRAMUSCULAR; INTRAVENOUS AS NEEDED
Status: DISCONTINUED | OUTPATIENT
Start: 2017-08-21 | End: 2017-08-21 | Stop reason: HOSPADM

## 2017-08-21 RX ORDER — SUMATRIPTAN 25 MG/1
50 TABLET, FILM COATED ORAL
Status: DISCONTINUED | OUTPATIENT
Start: 2017-08-21 | End: 2017-08-22 | Stop reason: HOSPADM

## 2017-08-21 RX ORDER — DOCUSATE SODIUM 100 MG/1
100 CAPSULE, LIQUID FILLED ORAL 2 TIMES DAILY
Status: DISCONTINUED | OUTPATIENT
Start: 2017-08-22 | End: 2017-08-22 | Stop reason: HOSPADM

## 2017-08-21 RX ORDER — NEOSTIGMINE METHYLSULFATE 1 MG/ML
INJECTION INTRAVENOUS AS NEEDED
Status: DISCONTINUED | OUTPATIENT
Start: 2017-08-21 | End: 2017-08-21 | Stop reason: HOSPADM

## 2017-08-21 RX ORDER — ROCURONIUM BROMIDE 10 MG/ML
INJECTION, SOLUTION INTRAVENOUS AS NEEDED
Status: DISCONTINUED | OUTPATIENT
Start: 2017-08-21 | End: 2017-08-21 | Stop reason: HOSPADM

## 2017-08-21 RX ORDER — PROPOFOL 10 MG/ML
INJECTION, EMULSION INTRAVENOUS AS NEEDED
Status: DISCONTINUED | OUTPATIENT
Start: 2017-08-21 | End: 2017-08-21 | Stop reason: HOSPADM

## 2017-08-21 RX ORDER — MIDAZOLAM HYDROCHLORIDE 1 MG/ML
INJECTION, SOLUTION INTRAMUSCULAR; INTRAVENOUS AS NEEDED
Status: DISCONTINUED | OUTPATIENT
Start: 2017-08-21 | End: 2017-08-21 | Stop reason: HOSPADM

## 2017-08-21 RX ORDER — ONDANSETRON 2 MG/ML
INJECTION INTRAMUSCULAR; INTRAVENOUS AS NEEDED
Status: DISCONTINUED | OUTPATIENT
Start: 2017-08-21 | End: 2017-08-21 | Stop reason: HOSPADM

## 2017-08-21 RX ORDER — DEXAMETHASONE SODIUM PHOSPHATE 4 MG/ML
INJECTION, SOLUTION INTRA-ARTICULAR; INTRALESIONAL; INTRAMUSCULAR; INTRAVENOUS; SOFT TISSUE AS NEEDED
Status: DISCONTINUED | OUTPATIENT
Start: 2017-08-21 | End: 2017-08-21 | Stop reason: HOSPADM

## 2017-08-21 RX ORDER — FENTANYL CITRATE 50 UG/ML
INJECTION, SOLUTION INTRAMUSCULAR; INTRAVENOUS AS NEEDED
Status: DISCONTINUED | OUTPATIENT
Start: 2017-08-21 | End: 2017-08-21 | Stop reason: HOSPADM

## 2017-08-21 RX ORDER — OXYCODONE AND ACETAMINOPHEN 5; 325 MG/1; MG/1
2 TABLET ORAL
Status: DISCONTINUED | OUTPATIENT
Start: 2017-08-21 | End: 2017-08-22 | Stop reason: HOSPADM

## 2017-08-21 RX ORDER — OXYCODONE AND ACETAMINOPHEN 5; 325 MG/1; MG/1
1-2 TABLET ORAL
Qty: 30 TAB | Refills: 0 | Status: SHIPPED | OUTPATIENT
Start: 2017-08-21 | End: 2018-06-07 | Stop reason: ALTCHOICE

## 2017-08-21 RX ORDER — OXYCODONE AND ACETAMINOPHEN 5; 325 MG/1; MG/1
1 TABLET ORAL
Status: DISCONTINUED | OUTPATIENT
Start: 2017-08-21 | End: 2017-08-22 | Stop reason: HOSPADM

## 2017-08-21 RX ADMIN — IOPAMIDOL 100 ML: 755 INJECTION, SOLUTION INTRAVENOUS at 20:34

## 2017-08-21 RX ADMIN — DEXTROSE MONOHYDRATE, SODIUM CHLORIDE, AND POTASSIUM CHLORIDE 100 ML/HR: 50; 9; 1.49 INJECTION, SOLUTION INTRAVENOUS at 23:52

## 2017-08-21 RX ADMIN — ROCURONIUM BROMIDE 15 MG: 10 INJECTION, SOLUTION INTRAVENOUS at 22:02

## 2017-08-21 RX ADMIN — NEOSTIGMINE METHYLSULFATE 1 MG: 1 INJECTION INTRAVENOUS at 22:26

## 2017-08-21 RX ADMIN — FENTANYL CITRATE 25 MCG: 50 INJECTION, SOLUTION INTRAMUSCULAR; INTRAVENOUS at 22:44

## 2017-08-21 RX ADMIN — ROCURONIUM BROMIDE 5 MG: 10 INJECTION, SOLUTION INTRAVENOUS at 21:53

## 2017-08-21 RX ADMIN — LIDOCAINE HYDROCHLORIDE 40 MG: 20 INJECTION, SOLUTION EPIDURAL; INFILTRATION; INTRACAUDAL; PERINEURAL at 21:53

## 2017-08-21 RX ADMIN — CEFOXITIN 1 G: 1 INJECTION, POWDER, FOR SOLUTION INTRAVENOUS at 21:55

## 2017-08-21 RX ADMIN — ONDANSETRON 4 MG: 2 INJECTION INTRAMUSCULAR; INTRAVENOUS at 21:53

## 2017-08-21 RX ADMIN — FENTANYL CITRATE 25 MCG: 50 INJECTION, SOLUTION INTRAMUSCULAR; INTRAVENOUS at 22:51

## 2017-08-21 RX ADMIN — FENTANYL CITRATE 100 MCG: 50 INJECTION, SOLUTION INTRAMUSCULAR; INTRAVENOUS at 21:53

## 2017-08-21 RX ADMIN — FENTANYL CITRATE 50 MCG: 50 INJECTION, SOLUTION INTRAMUSCULAR; INTRAVENOUS at 22:27

## 2017-08-21 RX ADMIN — ONDANSETRON 4 MG: 2 INJECTION INTRAMUSCULAR; INTRAVENOUS at 19:48

## 2017-08-21 RX ADMIN — SODIUM CHLORIDE, SODIUM LACTATE, POTASSIUM CHLORIDE, CALCIUM CHLORIDE: 600; 310; 30; 20 INJECTION, SOLUTION INTRAVENOUS at 21:48

## 2017-08-21 RX ADMIN — MIDAZOLAM HYDROCHLORIDE 2 MG: 1 INJECTION, SOLUTION INTRAMUSCULAR; INTRAVENOUS at 21:48

## 2017-08-21 RX ADMIN — SUCCINYLCHOLINE CHLORIDE 100 MG: 20 INJECTION INTRAMUSCULAR; INTRAVENOUS at 21:53

## 2017-08-21 RX ADMIN — Medication 4 MG: at 19:50

## 2017-08-21 RX ADMIN — SODIUM CHLORIDE, SODIUM LACTATE, POTASSIUM CHLORIDE, CALCIUM CHLORIDE: 600; 310; 30; 20 INJECTION, SOLUTION INTRAVENOUS at 22:42

## 2017-08-21 RX ADMIN — DEXAMETHASONE SODIUM PHOSPHATE 8 MG: 4 INJECTION, SOLUTION INTRA-ARTICULAR; INTRALESIONAL; INTRAMUSCULAR; INTRAVENOUS; SOFT TISSUE at 21:53

## 2017-08-21 RX ADMIN — GLYCOPYRROLATE 0.2 MG: 0.2 INJECTION INTRAMUSCULAR; INTRAVENOUS at 22:37

## 2017-08-21 RX ADMIN — SODIUM CHLORIDE 1000 ML: 900 INJECTION, SOLUTION INTRAVENOUS at 19:45

## 2017-08-21 RX ADMIN — GLYCOPYRROLATE 0.2 MG: 0.2 INJECTION INTRAMUSCULAR; INTRAVENOUS at 22:26

## 2017-08-21 RX ADMIN — PROPOFOL 200 MG: 10 INJECTION, EMULSION INTRAVENOUS at 21:53

## 2017-08-21 NOTE — ED NOTES
Report received from Lorri Brush [place]. Assumed care of pt. Bed locked and in low position with call bell within reach. Using AIDET-Introduced self as Primary RN and plan discussed with pt with understanding was verbalized. Pt denies additional complaints at this time. White board updated with this nurse's name. Patient advised that medical information will be discussed and it is their own responsibility to tell nurse if such conversation should not take place in the presence of visitors. Pt verbalizes understanding.

## 2017-08-21 NOTE — ED TRIAGE NOTES
Pt reports being sent by [facility] to r/o appendicitis. Had mid abd pain yesterday that radiated to right abd today. (+)fevers, but did not check with thermometer. (+)N/V and diarrhea x 1.

## 2017-08-21 NOTE — IP AVS SNAPSHOT
Gagan Guanakito 
 
 
 [address] 
[phone] Patient: Barrie Fine 
MRN: [id number]:[id number] You are allergic to the following No active allergies Recent Documentation Height Weight BMI OB Status Smoking Status 1.619 m 64.9 kg 24.75 kg/m2 IUD Never Smoker Unresulted Labs Order Current Status CULTURE, URINE In process Emergency Contacts Name Discharge Info Relation Home Work Mobile Phu Tuttle DISCHARGE CAREGIVER [3] Spouse [3] [phone] About your hospitalization You were admitted on:  August 21, 2017 You last received care in the:  [facility] 4M POST SURG ORT 2 You were discharged on:  August 22, 2017 Unit phone number:  [phone] Why you were hospitalized Your primary diagnosis was:  Not on File Your diagnoses also included:  Appendicitis, Acute Appendicitis Providers Seen During Your Hospitalizations Provider Role Specialty Primary office phone Berto Curz MD Attending Provider Emergency Medicine [phone] Mayra Miles MD Attending Provider General Surgery [phone] Your Primary Care Physician (PCP) Primary Care Physician Office Phone Office Fax Mccurtain Lucas [phone] Follow-up Information Follow up With Details Comments Contact Info Mayra Miles MD In 2 weeks  [address] 
[phone] Larissa Weeks MD   [facility] [address] [facility] [address] 
[phone] Mayra Miles MD In 2 weeks  [address] 
[phone] Current Discharge Medication List  
  
START taking these medications Dose & Instructions Dispensing Information Comments Morning Noon Evening Bedtime  
 oxyCODONE-acetaminophen 5-325 mg per tablet Commonly known as:  PERCOCET Your last dose was: Your next dose is:    
   
   
 Dose:  1-2 Tab Take 1-2 Tabs by mouth every four (4) hours as needed for Pain. Max Daily Amount: 12 Tabs. Indications: Pain Quantity:  30 Tab Refills:  0 CONTINUE these medications which have NOT CHANGED Dose & Instructions Dispensing Information Comments Morning Noon Evening Bedtime  
 levonorgestrel 20 mcg/24 hr (5 years) IUD Commonly known as:  MIRENA Your last dose was: Your next dose is:    
   
   
 Dose:  1 Each  
1 Each by IntraUTERine route once. Refills:  0 SUMAtriptan 50 mg tablet Commonly known as:  IMITREX Your last dose was: Your next dose is:    
   
   
 Dose:  50 mg Take 1 Tab by mouth once as needed for Migraine for up to 1 dose. May repeat in 2 hours with 2nd tablet; max 2 tabs in 24 hours Quantity:  12 Tab Refills:  2 Where to Get Your Medications Information on where to get these meds will be given to you by the nurse or doctor. ! Ask your nurse or doctor about these medications  
  oxyCODONE-acetaminophen 5-325 mg per tablet Discharge Instructions Dopioshart Activation Thank you for enrolling in [facility] [address] [facility]. Please follow the instructions below to securely access your online medical record. Topguest allows you to send messages to your doctor, view your test results, renew your prescriptions, schedule appointments, and more. How Do I Sign Up? 1. In your internet browser, go to [email or web address]. 2. Click on the First Time User? Click Here link in the Sign In box. You will see the New Member Sign Up page. 3. Enter your Topguest Access Code exactly as it appears below. You will not need to use this code after youve completed the sign-up process.  If you do not sign up before the expiration date, you must request a new code. Perkle Access Code: Activation code not generated Current Perkle Status: Active 4. Enter the last four digits of your Social Security Number (xxxx) and Date of Birth (mm/dd/yyyy) as indicated and click Submit. You will be taken to the next sign-up page. 5. Create a Perkle ID. This will be your Perkle login ID and cannot be changed, so think of one that is secure and easy to remember. 6. Create a Perkle password. You can change your password at any time. 7. Enter your Password Reset Question and Answer. This can be used at a later time if you forget your password. 8. Enter your e-mail address. You will receive e-mail notification when new information is available in [address] 9. Click Sign Up. You can now view your medical record. Additional Information Remember, Perkle is NOT to be used for urgent needs. For medical emergencies, dial 911. Now available from your iPhone and Android! Appendectomy: What to Expect at [facility] Your Recovery Your doctor removed your appendix either by making many small cuts, called incisions, in your belly (laparoscopic surgery) or through open surgery. In open surgery, the doctor makes one large incision. The incisions leave scars that usually fade over time. After your surgery, it is normal to feel weak and tired for several days after you return home. Your belly may be swollen and may be painful. If you had laparoscopic surgery, you may have pain in your shoulder for about 24 hours. You may also feel sick to your stomach and have diarrhea, constipation, gas, or a headache. This usually goes away in a few days. Your recovery time depends on the type of surgery you had. If you had laparoscopic surgery, you will probably be able to return to work or a normal routine 1 to 3 weeks after surgery.  If you had an open surgery, it may take 2 to 4 weeks. If your appendix ruptured, you may have a drain in your incision. Your body will work fine without an appendix. You will not have to make any changes in your diet or lifestyle. This care sheet gives you a general idea about how long it will take for you to recover. But each person recovers at a different pace. Follow the steps below to get better as quickly as possible. How can you care for yourself at home? Activity · Rest when you feel tired. Getting enough sleep will help you recover. · Try to walk each day. Start by walking a little more than you did the day before. Bit by bit, increase the amount you walk. Walking boosts blood flow and helps prevent pneumonia and constipation. · For about 2 weeks, avoid lifting anything that would make you strain. This may include a child, heavy grocery bags and milk containers, a heavy briefcase or backpack, cat litter or dog food bags, or a vacuum . · Avoid strenuous activities, such as bicycle riding, jogging, weight lifting, or aerobic exercise, until your doctor says it is okay. · You may shower. Pat the incision dry. Do not take a bath for the first 2 weeks, or until your doctor tells you it is okay. If you have a drain near your incision, follow your doctor's instructions. · You may drive when you are no longer taking pain medicine and can quickly move your foot from the gas pedal to the brake. You must also be able to sit comfortably for a long period of time, even if you do not plan on going far. You might get caught in traffic. · You will probably be able to go back to work in 1 to 3 weeks. If you had an open surgery, it may take 3 to 4 weeks. · Your doctor will tell you when you can have sex again. Diet · You can eat your normal diet. If your stomach is upset, try bland, low-fat foods like plain rice, broiled chicken, toast, and yogurt. · Drink plenty of fluids (unless your doctor tells you not to). · You may notice that your bowel movements are not regular right after your surgery. This is common. Try to avoid constipation and straining with bowel movements. You may want to take a fiber supplement every day. If you have not had a bowel movement after a couple of days, ask your doctor about taking a mild laxative. Medicines · Your doctor will tell you if and when you can restart your medicines. He or she will also give you instructions about taking any new medicines. · If you take blood thinners, such as warfarin (Coumadin), clopidogrel (Plavix), or aspirin, be sure to talk to your doctor. He or she will tell you if and when to start taking those medicines again. Make sure that you understand exactly what your doctor wants you to do. · If your appendix ruptured, you will need to take antibiotics. Take them as directed. Do not stop taking them just because you feel better. You need to take the full course of antibiotics. · Be safe with medicines. Take pain medicines exactly as directed. ¨ If the doctor gave you a prescription medicine for pain, take it as prescribed. ¨ If you are not taking a prescription pain medicine, take an over-the-counter medicine such as acetaminophen (Tylenol), ibuprofen (Advil, Motrin), or naproxen (Aleve). Read and follow all instructions on the label. ¨ Do not take two or more pain medicines at the same time unless the doctor told you to. Many pain medicines have acetaminophen, which is Tylenol. Too much Tylenol can be harmful. · If you think your pain medicine is making you sick to your stomach: 
¨ Take your medicine after meals (unless your doctor has told you not to). ¨ Ask your doctor for a different pain medicine. Incision care · Dermabond skin bandage applied. You may shower. Do not scrape off. · Keep the area clean and dry. You may cover it with a gauze bandage if it weeps or rubs against clothing. Change the bandage every day. Follow-up care is a key part of your treatment and safety. Be sure to make and go to all appointments, and call your doctor if you are having problems. It's also a good idea to know your test results and keep a list of the medicines you take. When should you call for help? Call 911 anytime you think you may need emergency care. For example, call if: 
· You passed out (lost consciousness). · You have sudden chest pain and shortness of breath, or you cough up blood. · You have severe trouble breathing. Call your doctor now or seek immediate medical care if: 
· You are sick to your stomach and cannot drink fluids. · You have severe diarrhea. · You have pain that does not get better when you take your pain medicine. · You have signs of infection, such as: 
¨ Increased pain, swelling, warmth, or redness. ¨ Red streaks leading from the wound. ¨ Pus draining from the wound. ¨ A fever. · You have loose stitches, or your incision comes open. · Bright red blood has soaked through a large bandage. · You have signs of a blood clot, such as: 
¨ Pain in your calf, back of knee, thigh, or groin. ¨ Redness and swelling in your leg or groin. Watch closely for any changes in your health, and be sure to contact your doctor if: 
· You had a laparoscopic surgery and your shoulder pain lasts more than 24 hours. · You have leakage around your drain or you have no new fluid in the drain for 24 hours. · The amount of drainage suddenly increases, or the color and texture changes. · You do not have a bowel movement after taking a laxative. Discharge Orders None Introducing [facility]! Dear Chloe Ventura: Thank you for requesting a Kash account. Our records indicate that you already have an active Kash account. You can access your account anytime at [email or web address] [email or web address]/"[email or web address] Did you know that you can access your hospital and ER discharge instructions at any time in MyChart? You can also review all of your test results from your hospital stay or ER visit. Additional Information If you have questions, please visit the Frequently Asked Questions section of the [employer] website at [email or web address] Remember, MyChart is NOT to be used for urgent needs. For medical emergencies, dial 911. Now available from your iPhone and Android! General Information Please provide this summary of care documentation to your next provider. Patient Signature:  ____________________________________________________________ Date:  ____________________________________________________________  
  
Clemente Livings Provider Signature:  ____________________________________________________________ Date:  ____________________________________________________________

## 2017-08-21 NOTE — ED PROVIDER NOTES
HPI Comments: 27 y.o. female with past medical history significant for irregular menses, hives, and chronic HA who presents from home with chief complaint of abdominal pain. The pt c/o periumbilical pain that started one day ago with multiple episodes of nausea and vomiting. She had one episode of loose stools. The pt reports that she has been more gassy today and the pain migrated to her RLQ. The pt has also been having subjective fevers. Pt denies taking anything for pain as she was afraid she would vomit. The pt has an IUD and has irregular cycles. The pt denies the chance she is pregnant, CP, sob, urinary sx or rash. There are no other acute medical concerns at this time. Social hx: nonsmoker, social drinking, no drug use  PCP: Kee Acosta MD    Note written by Jorge Luis Wilson, as dictated by MARI Borrero  7:30 PM      The history is provided by the patient. No  was used. Past Medical History:   Diagnosis Date    Chronic headache disorder     occurs w changes in weather, barometric pressure    Hives 9/2014    during URI. took amoxicillin prior, but took it several times before and was ok    Irregular menses     improved w OCP    Onychomycosis     Dr. Carter Saldaña derm)    Patellofemoral joint pain     was runner. saw PT.          Past Surgical History:   Procedure Laterality Date    HX WISDOM TEETH EXTRACTION           Family History:   Problem Relation Age of Onset    Diabetes Mother     Heart Disease Mother 39    Hypertension Mother     High Cholesterol Mother     Stroke Mother     Cancer Paternal Grandmother      lung    Heart Attack Paternal Grandmother     Cancer Paternal Grandfather      liver    Breast Cancer Other      breast, maternal aunt    Hypertension Maternal Grandmother     Heart Attack Maternal Grandmother        Social History     Social History    Marital status:      Spouse name: Kendell Spivey Number of children: 1    Years of education: N/A     Occupational History    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 Review of Systems   Constitutional: Positive for fever. Negative for chills. HENT: Negative for congestion, rhinorrhea, sneezing and sore throat. Eyes: Negative for redness and visual disturbance. Respiratory: Negative for shortness of breath. Cardiovascular: Negative for chest pain and leg swelling. Gastrointestinal: Positive for abdominal pain, nausea and vomiting. Diarrhea: loose stool. Genitourinary: Negative for difficulty urinating and frequency. Musculoskeletal: Negative for back pain, myalgias and neck stiffness. Skin: Negative for rash. Neurological: Negative for dizziness, syncope, weakness and headaches. Hematological: Negative for adenopathy. Patient Vitals for the past 12 hrs:   Temp Pulse Resp BP SpO2   08/21/17 2100 - - - 114/56 100 %   08/21/17 2045 - - - 120/64 98 %   08/21/17 2035 - 80 16 124/58 100 %   08/21/17 2000 - - - 123/64 94 %   08/21/17 1945 - - - 95/61 96 %   08/21/17 1927 99.5 °F (37.5 °C) 100 18 121/65 99 %              Physical Exam   Constitutional: She is oriented to person, place, and time. She appears well-developed and well-nourished. No distress. HENT:   Head: Normocephalic and atraumatic. Right Ear: External ear normal.   Left Ear: External ear normal.   Nose: Nose normal.   Mouth/Throat: Oropharynx is clear and moist.   Eyes: EOM are normal. Pupils are equal, round, and reactive to light. Neck: Neck supple. No JVD present. No tracheal deviation present.    Cardiovascular: Normal rate, regular rhythm, normal heart sounds and intact distal pulses. Exam reveals no gallop and no friction rub. No murmur heard. Pulmonary/Chest: Effort normal and breath sounds normal. No stridor. No respiratory distress. She has no wheezes. She has no rales. She exhibits no tenderness. Abdominal: Soft. Bowel sounds are normal. She exhibits no distension and no mass. There is tenderness. There is no rebound and no guarding. Periumbilical TTP with focal RLQ TTP no rebounding or guarding no CVAT   Musculoskeletal: Normal range of motion. She exhibits no edema, tenderness or deformity. Lymphadenopathy:     She has no cervical adenopathy. Neurological: She is alert and oriented to person, place, and time. No cranial nerve deficit. Coordination normal.   Skin: No rash noted. No erythema. No pallor. Psychiatric: She has a normal mood and affect. Her behavior is normal.   Nursing note and vitals reviewed. MDM  Number of Diagnoses or Management Options  Acute appendicitis, unspecified acute appendicitis type:      Amount and/or Complexity of Data Reviewed  Clinical lab tests: ordered and reviewed  Tests in the radiology section of CPT®: ordered and reviewed  Tests in the medicine section of CPT®: reviewed and ordered  Review and summarize past medical records: yes  Independent visualization of images, tracings, or specimens: yes    Patient Progress  Patient progress: stable    ED Course       Procedures    8:13 PM  Discussed pt, sx, hx and current findings with Dr Jeromy Serna. He is in agreement with plan   Meri Lagos. TASHA Mcdonald    8:45 PM  Meri Lagos. TASHA Mcdonald spoke with Dr. Radha Nobles, Consult for Surgery. Discussed available diagnostic tests and clinical findings. He is in agreement with care plans as outlined. He will see pt  MARI Montalvo    Critical Care:   The reason for providing this level of medical care for this critically ill patient was due to a critical illness that impaired one or more vital organ systems such that there was a high probability of imminent or life threatening deterioration in the patients condition. This care involved high complexity decision making to assess, manipulate, and support vital system functions. Total critical care time spend exclusive of procedures:  35 min    LABS COMPLETED AND REVIEWED:  Recent Results (from the past 12 hour(s))   CBC WITH AUTOMATED DIFF    Collection Time: 08/21/17  7:36 PM   Result Value Ref Range    WBC 18.6 (H) 3.6 - 11.0 K/uL    RBC 5.04 3.80 - 5.20 M/uL    HGB 15.2 11.5 - 16.0 g/dL    HCT 44.5 35.0 - 47.0 %    MCV 88.3 80.0 - 99.0 FL    MCH 30.2 26.0 - 34.0 PG    MCHC 34.2 30.0 - 36.5 g/dL    RDW 13.7 11.5 - 14.5 %    PLATELET 957 735 - 808 K/uL    NEUTROPHILS 88 (H) 32 - 75 %    LYMPHOCYTES 6 (L) 12 - 49 %    MONOCYTES 6 5 - 13 %    EOSINOPHILS 0 0 - 7 %    BASOPHILS 0 0 - 1 %    ABS. NEUTROPHILS 16.2 (H) 1.8 - 8.0 K/UL    ABS. LYMPHOCYTES 1.2 0.8 - 3.5 K/UL    ABS. MONOCYTES 1.2 (H) 0.0 - 1.0 K/UL    ABS. EOSINOPHILS 0.0 0.0 - 0.4 K/UL    ABS. BASOPHILS 0.0 0.0 - 0.1 K/UL   METABOLIC PANEL, COMPREHENSIVE    Collection Time: 08/21/17  7:36 PM   Result Value Ref Range    Sodium 134 (L) 136 - 145 mmol/L    Potassium 3.2 (L) 3.5 - 5.1 mmol/L    Chloride 100 97 - 108 mmol/L    CO2 26 21 - 32 mmol/L    Anion gap 8 5 - 15 mmol/L    Glucose 194 (H) 65 - 100 mg/dL    BUN 11 6 - 20 MG/DL    Creatinine 1.19 (H) 0.55 - 1.02 MG/DL    BUN/Creatinine ratio 9 (L) 12 - 20      GFR est AA >60 >60 ml/min/1.73m2    GFR est non-AA 52 (L) >60 ml/min/1.73m2    Calcium 8.4 (L) 8.5 - 10.1 MG/DL    Bilirubin, total 1.2 (H) 0.2 - 1.0 MG/DL    ALT (SGPT) 19 12 - 78 U/L    AST (SGOT) 11 (L) 15 - 37 U/L    Alk.  phosphatase 65 45 - 117 U/L    Protein, total 7.5 6.4 - 8.2 g/dL    Albumin 3.8 3.5 - 5.0 g/dL    Globulin 3.7 2.0 - 4.0 g/dL    A-G Ratio 1.0 (L) 1.1 - 2.2     LIPASE    Collection Time: 08/21/17  7:36 PM   Result Value Ref Range    Lipase 145 73 - 393 U/L URINALYSIS W/ REFLEX CULTURE    Collection Time: 08/21/17  8:26 PM   Result Value Ref Range    Color DARK YELLOW      Appearance CLOUDY (A) CLEAR      Specific gravity 1.030 1.003 - 1.030      pH (UA) 6.0 5.0 - 8.0      Protein TRACE (A) NEG mg/dL    Glucose 100 (A) NEG mg/dL    Ketone NEGATIVE  NEG mg/dL    Bilirubin NEGATIVE  NEG      Blood NEGATIVE  NEG      Urobilinogen 0.2 0.2 - 1.0 EU/dL    Nitrites NEGATIVE  NEG      Leukocyte Esterase NEGATIVE  NEG      WBC PENDING /hpf    RBC PENDING /hpf    Epithelial cells PENDING /lpf    Bacteria PENDING /hpf    UA:UC IF INDICATED PENDING    HCG URINE, QL. - POC    Collection Time: 08/21/17  8:26 PM   Result Value Ref Range    Pregnancy test,urine (POC) NEGATIVE  NEG         IMAGING COMPLETED AND REVIEWED:  The following have been ordered and reviewed:    Ct Abd Pelv W Cont    Result Date: 8/21/2017  EXAM:  CT ABD PELV W CONT INDICATION: Mid abdominal pain yesterday now radiates into the right lower quadrant today. Fevers, nausea, and vomiting. Leukocytosis. COMPARISON: None CONTRAST:  100 mL of Isovue-370. TECHNIQUE: Following the uneventful intravenous administration of contrast, thin axial images were obtained through the abdomen and pelvis. Coronal and sagittal reconstructions were generated. Oral contrast was not administered. CT dose reduction was achieved through use of a standardized protocol tailored for this examination and automatic exposure control for dose modulation. FINDINGS: LUNG BASES: Clear. INCIDENTALLY IMAGED HEART AND MEDIASTINUM: Unremarkable. LIVER: No mass or biliary dilatation. GALLBLADDER: Unremarkable. SPLEEN: No mass. PANCREAS: No mass or ductal dilatation. ADRENALS: Unremarkable. KIDNEYS: No mass, calculus, or hydronephrosis. STOMACH: Nondistended. SMALL BOWEL: No dilatation or wall thickening. COLON: No dilatation or wall thickening. APPENDIX: Dilated to 16 mm. Stool in the proximal appendix. Fluid around the appendix.  No drainable fluid collection. Location is between the cecum and the right psoas muscle. PERITONEUM: No pneumoperitoneum. Trace ascites. RETROPERITONEUM: No lymphadenopathy or aortic aneurysm. REPRODUCTIVE ORGANS: Intrauterine device is in good position within a normal-sized uterus. No adnexal mass. URINARY BLADDER: Nondistended. BONES: No destructive bone lesion. ADDITIONAL COMMENTS: N/A     IMPRESSION: 1. Acute appendicitis. No drainable abscess. 2. No other acute process. Dr. Merlyn Ibrahim discussed this impression with the treating surgeon at 2050 hours on 8/21/2017. MEDICATIONS GIVEN:  Medications   cefOXitin (MEFOXIN) 1 g in 0.9% sodium chloride (MBP/ADV) 50 mL (not administered)   0.9% sodium chloride infusion 1,000 mL (1,000 mL IntraVENous New Bag 8/21/17 1945)   morphine injection 4 mg (4 mg IntraVENous Given 8/21/17 1950)   ondansetron (ZOFRAN) injection 4 mg (4 mg IntraVENous Given 8/21/17 1948)   iopamidol (ISOVUE-370) 76 % injection 100 mL (100 mL IntraVENous Given 8/21/17 2034)         CLINICAL IMPRESSION:  1. Acute appendicitis, unspecified acute appendicitis type          Plan  1. Admission per Dr. Opal Soto      8:45 PM  The patient is being admitted to the hospital.  The results of their tests and reasons for their admission have been discussed with them and/or available family. The patient/family has conveyed agreement and understanding for the need to be admitted and for their admission diagnosis. Consultation has been made with the inpatient physician specialist for hospitalization.

## 2017-08-21 NOTE — ED NOTES
Last meal was yesterday around 1400, then pt vomited. States last had a couple sips of water around 1800 today.

## 2017-08-22 VITALS
OXYGEN SATURATION: 99 % | BODY MASS INDEX: 24.43 KG/M2 | RESPIRATION RATE: 18 BRPM | SYSTOLIC BLOOD PRESSURE: 101 MMHG | DIASTOLIC BLOOD PRESSURE: 67 MMHG | TEMPERATURE: 98 F | WEIGHT: 143.08 LBS | HEART RATE: 56 BPM | HEIGHT: 64 IN

## 2017-08-22 PROCEDURE — 99218 HC RM OBSERVATION: CPT

## 2017-08-22 PROCEDURE — 74011250637 HC RX REV CODE- 250/637: Performed by: SURGERY

## 2017-08-22 PROCEDURE — 74011250636 HC RX REV CODE- 250/636: Performed by: PHYSICIAN ASSISTANT

## 2017-08-22 RX ADMIN — SODIUM CHLORIDE 500 ML: 900 INJECTION, SOLUTION INTRAVENOUS at 11:14

## 2017-08-22 RX ADMIN — DOCUSATE SODIUM 100 MG: 100 CAPSULE ORAL at 09:58

## 2017-08-22 RX ADMIN — OXYCODONE AND ACETAMINOPHEN 1 TABLET: 5; 325 TABLET ORAL at 05:49

## 2017-08-22 NOTE — BRIEF OP NOTE
BRIEF OPERATIVE NOTE    Date of Procedure: 8/21/2017   Preoperative Diagnosis: Acute Appendicitis  Postoperative Diagnosis: Acute Appendicitis    Procedure(s):  LAPAROSCOPIC APPENDECTOMY   Surgeon(s) and Role:     * Cory Lo MD - Primary         Assistant Staff:       Surgical Staff:  Circ-1: Jake Zimmer RN  Scrub Tech-1: Solo Ramirez.  Ty  Surg Asst-1: Hannah Guevara RN  Surg Asst-Relief: Miles Lewis LPN  Event Time In   Incision Start 2204   Incision Close      Anesthesia: General   Estimated Blood Loss: 5 cc  Specimens:   ID Type Source Tests Collected by Time Destination   1 : APPENDIX Preservative Appendix  Cory Lo MD 8/21/2017 8127 Pathology      Findings: Inflamed non-perforated appendix   Complications: None  Implants: * No implants in log *

## 2017-08-22 NOTE — OP NOTES
Date: 08/21/17     Pre-operative Diagnosis: Acute appendicitis      Post-operative Diagnosis: Acute appendicitis      Procedure: Laparoscopic Appendectomy      Surgeon: Mayra Miles MD     Surgical Assistant: Karissa Vega     Anesthesia: General     Estimated Blood Loss: 5 cc     Findings:Inflamed non-perforated apendix. Specimen: Appendix     Indication: 27year-old female with acute appendicitis.     Procedure: After consent was obtained, the patient was taken back to the operating room and placed in a supine position. After induction of general anesthesia and endotracheal intubation, the abdomen was prepped and draped in normal sterile fashion and left arm tucked. The patient is being treated for appendicitis, and appropriate antibiotic therapy was verified in the EMR as given with the team during pre-incision time out. Pre-incision subcutaneous local analgesia was instilled in the pre-planned port site tissues and the first incision was made a minute later. Access to the abdomen was obtained via an direct cutdown onto the fascia through an infraumbilical incision. A 5-12 mm blunt tip balloon port was placed and pneumoperitoneum was established a 15mmHg. Inspection of the underlying bowel showed no injury. Next a suprapubic 5mm port and left lower quadrant 5 mm port was placed under direct vision.        The non-perforated gangrenous appendix was identified in the right lower quadrant in plain view at the confluence of the taenia. The junction of the terminal ileum to the cecum and the ascending colon was also identified for further confirmation. There was no peritoneal fluid in the right lower quadrant. The meso-appendix was grasped and lifted upwards to clearly identify the base and the junction with the cecum. The meso appendix was divided using one 45mm tan load stapler. The base of the appendix was then transected using a 60 mm purple load stapler.  The appendix was then retrieved through the infra-umbilical port using an endo catch retrieval bag. Reexamination of the right lower quadrant revealed no bleeding. Exploration of all four abdominal quadrants revealed unremarkable anatomy. Hemostasis was satisfactory. Instrument, sponge and needle counts were correct. The ports were removed under direct vision confirming no rectus bleeding, peritoneal bleeding or injury to intraabdominal structures. The pneumoperitoneum was maximally evacuated. The 96SO infra-umbilical port site fascial defect was closed with an 0-PDS figure of eight stitch. All skin was closed with subcuticular 4-0 Monocryl, Dermabond was applied.     The patient tolerated procedure well and returned to the post-anesthesia recovery room in stable condition.  I went directly to the patient waiting area to discuss the findings with the patient's  in the waiting area.  Adama Copeland MD

## 2017-08-22 NOTE — H&P
Assessment:     Acute appendicitis    Plan:     Consent for : laparoscopic appendectomy possible open. Risks discussed included the risk of general anesthesia, bleeding, infection, hernia, injury to intra-abdominal organs or blood vessels, need for additional procedures or conversion to open surgery. All questions answered. Cefoxitin 1 gm ordered  NPO  Will admit for observation post-op    Signed By: Biju Moseley MD  [facility]  Office:  [phone]    August 21, 2017          General Surgery H&P    Subjective:      I was asked to see Taylormiranda Julio by Dr. Valentine Boss for management of acute appendicitis. The patient is a 27 y.o. female who presents for evaluation of 1 day history of periumbilical abdominal pain that has progressed and localized to the right lower quadrant. Pain is associated with nausea, diarrhea, anorexia and fever. She denies previous abdominal surgery, personal history of Crohn's disease, previous history of similar symptoms. Work-up in the ED showed leukocytosis of 18.6, hypokalemia of 3.2. CT abdomen/pelvis showed a 16 mm dilated retrocecal appendix with surrounding fluid. No drainable abscess was present. Past Medical History:   Diagnosis Date    Chronic headache disorder     occurs w changes in weather, barometric pressure    Hives 9/2014    during URI. took amoxicillin prior, but took it several times before and was ok    Irregular menses     improved w OCP    Onychomycosis     Dr. Kassandra De La Cruz [facility])    Patellofemoral joint pain     was runner. saw PT.            Past Surgical History:   Procedure Laterality Date    HX WISDOM TEETH EXTRACTION        Family History   Problem Relation Age of Onset    Diabetes Mother     Heart Disease Mother 39    Hypertension Mother     High Cholesterol Mother     Stroke Mother     Cancer Paternal Grandmother      lung    Heart Attack Paternal Grandmother     Cancer Paternal Grandfather      liver    Breast Cancer Other      breast, maternal aunt    Hypertension Maternal Grandmother     Heart Attack Maternal Grandmother      Social History     Social History    Marital status:      Spouse name: Myla Carrasco Number of children: 1    Years of education: N/A     Occupational History    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) [address] [place] [address] Avenue History Main Topics    Smoking status: Never Smoker    Smokeless tobacco: Never Used    Alcohol use No      Comment: occasionally    Drug use: No    Sexual activity: Yes     Partners: Male     Birth control/ protection: Pill     Other Topics Concern    None     Social History Narrative      Current Facility-Administered Medications   Medication Dose Route Frequency    cefOXitin (MEFOXIN) 1 g in 0.9% sodium chloride (MBP/ADV) 50 mL  1 g IntraVENous Q6H     Current Outpatient Prescriptions   Medication Sig    SUMAtriptan (IMITREX) 50 mg tablet Take 1 Tab by mouth once as needed for Migraine for up to 1 dose. May repeat in 2 hours with 2nd tablet; max 2 tabs in 24 hours    levonorgestrel (MIRENA) 20 mcg/24 hr (5 years) IUD 1 Each by IntraUTERine route once.       No Known Allergies    Review of Systems:     []     Unable to obtain  ROS due to  []    mental status change  []    sedated   []    intubated   [x]    Total of 12 system negative, unless specified below or in HPI:  Constitutional: negative fever, negative chills, negative weight loss  Eyes:   negative visual changes  ENT:   negative sore throat, tongue or lip swelling  Respiratory:  negative cough, negative dyspnea  Cards:  negative for chest pain, palpitations, lower extremity edema  GI:   negative for nausea, vomiting, diarrhea, and abdominal pain  :  negative for frequency, dysuria  Integument:  negative for rash and pruritus  Heme:  negative for easy bruising and gum/nose bleeding  Musculoskel: negative for myalgias,  back pain and muscle weakness  Neuro:  negative for headaches, dizziness, vertigo  Psych:  negative for feelings of anxiety, depression     Objective:      Patient Vitals for the past 8 hrs:   BP Temp Pulse Resp SpO2 Height Weight   17 114/56 - - - 100 % - -   17 120/64 - - - 98 % - -   17 124/58 - 80 16 100 % - -   17 123/64 - - - 94 % - -   17 95/61 - - - 96 % - -   17 121/65 99.5 °F (37.5 °C) 100 18 99 % 5' 3.75\" (1.619 m) 64.9 kg (143 lb 1.3 oz)       Temp (24hrs), Av.5 °F (37.5 °C), Min:99.5 °F (37.5 °C), Max:99.5 °F (37.5 °C)      Physical Exam:  General:  Alert, cooperative, no distress, appears stated age. Eyes:  Conjunctivae/corneas clear. PERRL, EOMs intact. Nose: Nares normal. Septum midline. Mucosa normal. No drainage or sinus tenderness. Mouth/Throat: Lips, mucosa, and tongue normal. Teeth and gums normal.   Neck: Supple, symmetrical, trachea midline, no adenopathy, thyroid: no enlargment/tenderness/nodules, no carotid bruit and no JVD. Back:   Symmetric, no curvature. ROM normal. No CVA tenderness. Lungs:   Clear to auscultation bilaterally. Heart:  Regular rate and rhythm, S1, S2 normal, no murmur, click, rub or gallop. Abdomen:   Soft, RLQ tenderness with guarding and rebound. Extremities: Extremities normal, atraumatic, no cyanosis or edema. Pulses: 2+ and symmetric all extremities. Skin: Skin color, texture, turgor normal. No rashes or lesions   Lymph nodes: Cervical, supraclavicular, and axillary nodes normal.     Labs: Recent Labs      17   WBC  18.6*   HGB  15.2   HCT  44.5   PLT  203     Recent Labs      17   NA  134*   K  3.2*   CL  100   CO2  26   GLU  194*   BUN  11   CREA  1.19*   CA  8.4*   ALB  3.8   TBILI  1.2*   SGOT  11*   ALT  19     No results for input(s): INR in the last 72 hours.     No lab exists for component: INREXT

## 2017-08-22 NOTE — PROGRESS NOTES
[facility]: MED RECONCILIATION    Comments/Recommendations:   · Verified allergies as documented: NKA  · Med rec completed with patient  · She was taking a stool softener with prenatal vitamins but has not been taking them regularly for a while now. Medications added:     · None    Medications removed:    · Flonase  · Docusate  · Prenatal vitamins    Medications adjusted:    · None    Information obtained from: Patient    Allergies: Review of patient's allergies indicates no known allergies. Prior to Admission Medications:   Prior to Admission Medications   Prescriptions Last Dose Informant Patient Reported? Taking? SUMAtriptan (IMITREX) 50 mg tablet 2017 Self No Yes   Sig: Take 1 Tab by mouth once as needed for Migraine for up to 1 dose. May repeat in 2 hours with 2nd tablet; max 2 tabs in 24 hours   levonorgestrel (MIRENA) 20 mcg/24 hr (5 years) IUD May 2017 Self Yes Yes   Si Each by IntraUTERine route once.       Facility-Administered Medications: None         Thank you,  Arnie Duran, PharmD     Contact: [phone]

## 2017-08-22 NOTE — ANESTHESIA POSTPROCEDURE EVALUATION
Post-Anesthesia Evaluation and Assessment    Patient: Oralia Manley MRN: [id number]  SSN: [id number]    YOB: 1985  Age: 27 y.o. Sex: female       Cardiovascular Function/Vital Signs  Visit Vitals    /54    Pulse 94    Temp 37.3 °C (99.1 °F)    Resp 21    Ht 5' 3.75\" (1.619 m)    Wt 64.9 kg (143 lb 1.3 oz)    SpO2 94%    BMI 24.75 kg/m2       Patient is status post general anesthesia for Procedure(s):  APPENDECTOMY LAPAROSCOPIC. Nausea/Vomiting: None    Postoperative hydration reviewed and adequate. Pain:  Pain Scale 1: Numeric (0 - 10) (08/21/17 2245)  Pain Intensity 1: 5 (08/21/17 2245)   Managed    Neurological Status:   Neuro (WDL): Within Defined Limits (08/21/17 2245)   At baseline    Mental Status and Level of Consciousness: Arousable    Pulmonary Status:   O2 Device: Room air (08/21/17 2251)   Adequate oxygenation and airway patent    Complications related to anesthesia: None    Post-anesthesia assessment completed.  No concerns    Signed By: Sharyn Thao MD     August 21, 2017

## 2017-08-22 NOTE — DISCHARGE INSTRUCTIONS
[employer] Activation    Thank you for enrolling in [address] Please follow the instructions below to securely access your online medical record. [facility] allows you to send messages to your doctor, view your test results, renew your prescriptions, schedule appointments, and more. How Do I Sign Up? 1. In your internet browser, go to [email or web address]. 2. Click on the First Time User? Click Here link in the Sign In box. You will see the New Member Sign Up page. 3. Enter your [facility] Access Code exactly as it appears below. You will not need to use this code after youve completed the sign-up process. If you do not sign up before the expiration date, you must request a new code. [facility] Access Code: Activation code not generated  Current [facility] Status: Active     4. Enter the last four digits of your Social Security Number (xxxx) and Date of Birth (mm/dd/yyyy) as indicated and click Submit. You will be taken to the next sign-up page. 5. Create a [facility] ID. This will be your [facility] login ID and cannot be changed, so think of one that is secure and easy to remember. 6. Create a [facility] password. You can change your password at any time. 7. Enter your Password Reset Question and Answer. This can be used at a later time if you forget your password. 8. Enter your e-mail address. You will receive e-mail notification when new information is available in [address] 9. Click Sign Up. You can now view your medical record. Additional Information    Remember, [facility] is NOT to be used for urgent needs. For medical emergencies, dial 911. Now available from your iPhone and Android! Appendectomy: What to Expect at Home  Your Recovery    Your doctor removed your appendix either by making many small cuts, called incisions, in your belly (laparoscopic surgery) or through open surgery. In open surgery, the doctor makes one large incision.  The incisions leave scars that usually fade over time.  After your surgery, it is normal to feel weak and tired for several days after you return home. Your belly may be swollen and may be painful. If you had laparoscopic surgery, you may have pain in your shoulder for about 24 hours. You may also feel sick to your stomach and have diarrhea, constipation, gas, or a headache. This usually goes away in a few days. Your recovery time depends on the type of surgery you had. If you had laparoscopic surgery, you will probably be able to return to work or a normal routine 1 to 3 weeks after surgery. If you had an open surgery, it may take 2 to 4 weeks. If your appendix ruptured, you may have a drain in your incision. Your body will work fine without an appendix. You will not have to make any changes in your diet or lifestyle. This care sheet gives you a general idea about how long it will take for you to recover. But each person recovers at a different pace. Follow the steps below to get better as quickly as possible. How can you care for yourself at home? Activity  · Rest when you feel tired. Getting enough sleep will help you recover. · Try to walk each day. Start by walking a little more than you did the day before. Bit by bit, increase the amount you walk. Walking boosts blood flow and helps prevent pneumonia and constipation. · For about 2 weeks, avoid lifting anything that would make you strain. This may include a child, heavy grocery bags and milk containers, a heavy briefcase or backpack, cat litter or dog food bags, or a vacuum . · Avoid strenuous activities, such as bicycle riding, jogging, weight lifting, or aerobic exercise, until your doctor says it is okay. · You may shower. Pat the incision dry. Do not take a bath for the first 2 weeks, or until your doctor tells you it is okay. If you have a drain near your incision, follow your doctor's instructions.   · You may drive when you are no longer taking pain medicine and can quickly move your foot from the gas pedal to the brake. You must also be able to sit comfortably for a long period of time, even if you do not plan on going far. You might get caught in traffic. · You will probably be able to go back to work in 1 to 3 weeks. If you had an open surgery, it may take 3 to 4 weeks. · Your doctor will tell you when you can have sex again. Diet  · You can eat your normal diet. If your stomach is upset, try bland, low-fat foods like plain rice, broiled chicken, toast, and yogurt. · Drink plenty of fluids (unless your doctor tells you not to). · You may notice that your bowel movements are not regular right after your surgery. This is common. Try to avoid constipation and straining with bowel movements. You may want to take a fiber supplement every day. If you have not had a bowel movement after a couple of days, ask your doctor about taking a mild laxative. Medicines  · Your doctor will tell you if and when you can restart your medicines. He or she will also give you instructions about taking any new medicines. · If you take blood thinners, such as warfarin (Coumadin), clopidogrel (Plavix), or aspirin, be sure to talk to your doctor. He or she will tell you if and when to start taking those medicines again. Make sure that you understand exactly what your doctor wants you to do. · If your appendix ruptured, you will need to take antibiotics. Take them as directed. Do not stop taking them just because you feel better. You need to take the full course of antibiotics. · Be safe with medicines. Take pain medicines exactly as directed. ¨ If the doctor gave you a prescription medicine for pain, take it as prescribed. ¨ If you are not taking a prescription pain medicine, take an over-the-counter medicine such as acetaminophen (Tylenol), ibuprofen (Advil, Motrin), or naproxen (Aleve). Read and follow all instructions on the label.   ¨ Do not take two or more pain medicines at the same time unless the doctor told you to. Many pain medicines have acetaminophen, which is Tylenol. Too much Tylenol can be harmful. · If you think your pain medicine is making you sick to your stomach:  ¨ Take your medicine after meals (unless your doctor has told you not to). ¨ Ask your doctor for a different pain medicine. Incision care  · Dermabond skin bandage applied. You may shower. Do not scrape off. · Keep the area clean and dry. You may cover it with a gauze bandage if it weeps or rubs against clothing. Change the bandage every day. Follow-up care is a key part of your treatment and safety. Be sure to make and go to all appointments, and call your doctor if you are having problems. It's also a good idea to know your test results and keep a list of the medicines you take. When should you call for help? Call 911 anytime you think you may need emergency care. For example, call if:  · You passed out (lost consciousness). · You have sudden chest pain and shortness of breath, or you cough up blood. · You have severe trouble breathing. Call your doctor now or seek immediate medical care if:  · You are sick to your stomach and cannot drink fluids. · You have severe diarrhea. · You have pain that does not get better when you take your pain medicine. · You have signs of infection, such as:  ¨ Increased pain, swelling, warmth, or redness. ¨ Red streaks leading from the wound. ¨ Pus draining from the wound. ¨ A fever. · You have loose stitches, or your incision comes open. · Bright red blood has soaked through a large bandage. · You have signs of a blood clot, such as:  ¨ Pain in your calf, back of knee, thigh, or groin. ¨ Redness and swelling in your leg or groin. Watch closely for any changes in your health, and be sure to contact your doctor if:  · You had a laparoscopic surgery and your shoulder pain lasts more than 24 hours.   · You have leakage around your drain or you have no new fluid in the drain for 24 hours.  · The amount of drainage suddenly increases, or the color and texture changes. · You do not have a bowel movement after taking a laxative.

## 2017-08-22 NOTE — PROGRESS NOTES
8/22/2017   CARE MANAGEMENT NOTE:  CM is following pt for initial discharge planning. EMR reviewed. CM met with pt and her  at bedside to obtain hx for this needs assessment. Reportedly, pt resides with her  and two children in a two story home with bedroom on the ground floor. PTA, pt was ambulatory, indepn with ADLs, and drives. She uses [facility] [place]. Pt does not have home healthcare nor DME for home use. PCP is Dr. Zohra Chase who she last saw a couple of months ago. CM notified Nurse Navigator, Severo Bounds of pt's admission under OBS. Plan is to return home when medically stable. Pt is currently admitted under OBS status. CM provided written and oral explanation of OBS letter. A signed copy was placed into pt's chart.   Amanda

## 2017-08-22 NOTE — ED NOTES
TRANSFER - OUT REPORT:    Verbal report given to Ed, RN(name) on Beth Varelau  being transferred to [facility]) for ordered procedure       Report consisted of patients Situation, Background, Assessment and   Recommendations(SBAR). Information from the following report(s) SBAR, ED Summary, [facility] and Recent Results was reviewed with the receiving nurse. Lines:   Peripheral IV 08/21/17 Left Antecubital (Active)   Site Assessment Clean, dry, & intact 8/21/2017  7:36 PM   Phlebitis Assessment 0 8/21/2017  7:36 PM   Infiltration Assessment 0 8/21/2017  7:36 PM   Dressing Status Clean, dry, & intact 8/21/2017  7:36 PM   Hub Color/Line Status Pink 8/21/2017  7:36 PM        Opportunity for questions and clarification was provided. Patient transported with:   Jared (from PACU)  Ed, RN reports he will start the antibiotic in PACU. Medication sent up with pt.

## 2017-08-22 NOTE — PROGRESS NOTES
Primary Nurse Jovi Waters, ADELINA and Haroon Santiago RN performed a dual skin assessment on this patient Impairment noted- see wound doc flow sheet  Caden score is 21

## 2017-08-22 NOTE — PERIOP NOTES
TRANSFER - OUT REPORT:    Verbal report given to [facility] on Jorge Julio  being transferred to [id number] for routine post - op       Report consisted of patients Situation, Background, Assessment and   Recommendations(SBAR). Information from the following report(s) SBAR was reviewed with the receiving nurse. Lines:   Peripheral IV 08/21/17 Left Antecubital (Active)   Site Assessment Clean, dry, & intact 8/21/2017 11:29 PM   Phlebitis Assessment 0 8/21/2017 11:29 PM   Infiltration Assessment 0 8/21/2017 11:29 PM   Dressing Status Clean, dry, & intact 8/21/2017 11:29 PM   Dressing Type Transparent 8/21/2017 11:29 PM   Hub Color/Line Status Pink 8/21/2017 11:29 PM        Opportunity for questions and clarification was provided.       Patient transported with:   Registered Nurse

## 2017-08-22 NOTE — PROGRESS NOTES
Bedside and Verbal shift change report given to [facility] NANCY LION RN (oncoming nurse) by Juan Paredes RN (offgoing nurse). Report included the following information SBAR and Kardex.

## 2017-08-22 NOTE — PROGRESS NOTES
MARI Mcconnell  Aware of low B/P and patients complaints of feeling light-headed and dizzy, verbal order received for 500cc NS bolus

## 2017-08-22 NOTE — PROGRESS NOTES
Discharge instructions reviewed with and copy given to patient along with prescription, patient verbalizes understanding of instructions with no questions offered at this time

## 2017-08-22 NOTE — PROGRESS NOTES
General Surgery Daily Progress Note    Patient: Ana Stacy MRN: [id number]  SSN: [id number]    YOB: 1985  Age: 27 y.o. Sex: female      Admit Date: 8/21/2017    Subjective:   Feeling well, pain controlled, denies N/V. Current Facility-Administered Medications   Medication Dose Route Frequency    SUMAtriptan (IMITREX) tablet 50 mg  50 mg Oral ONCE PRN    sodium chloride (NS) flush 5-10 mL  5-10 mL IntraVENous Q8H    sodium chloride (NS) flush 5-10 mL  5-10 mL IntraVENous PRN    HYDROmorphone (PF) (DILAUDID) injection 0.5 mg  0.5 mg IntraVENous Q4H PRN    docusate sodium (COLACE) capsule 100 mg  100 mg Oral BID    diphenhydrAMINE (BENADRYL) injection 12.5 mg  12.5 mg IntraVENous Q4H PRN    ondansetron (ZOFRAN) injection 4 mg  4 mg IntraVENous Q4H PRN    oxyCODONE-acetaminophen (PERCOCET) 5-325 mg per tablet 1 Tab  1 Tab Oral Q4H PRN    oxyCODONE-acetaminophen (PERCOCET) 5-325 mg per tablet 2 Tab  2 Tab Oral Q4H PRN    dextrose 5% - 0.9% NaCl with KCl 20 mEq/L infusion  100 mL/hr IntraVENous CONTINUOUS        Objective:   08/22 0701 - 08/22 1900  In: -   Out: 400 [Urine:400]  08/20 1901 - 08/22 0700  In: 2046.7 [P.O.:360; I.V.:1686.7]  Out: 1215 [Urine:1200]  Patient Vitals for the past 8 hrs:   BP Temp Pulse Resp SpO2   08/22/17 0825 99/53 98.1 °F (36.7 °C) 71 18 98 %   08/22/17 0245 125/62 98.2 °F (36.8 °C) 83 18 97 %   08/22/17 0154 110/58 98.4 °F (36.9 °C) 67 18 97 %       Physical Exam:  General: Alert, cooperative, NAD  Lungs: Unlabored  Heart:  Regular rate and rhythm  Abdomen: Soft, ATTP, mildly distended. + bowel sounds.  Incisions c/d/i   Extremities: Warm, moves all, no edema  Skin:  Warm and dry, no rash    Labs: Recent Labs      08/21/17 1936   WBC  18.6*   HGB  15.2   HCT  44.5   PLT  203     Recent Labs      08/21/17 1936   NA  134*   K  3.2*   CL  100   CO2  26   GLU  194*   BUN  11   CREA  1.19*   CA  8.4*   ALB  3.8   TBILI  1.2*   SGOT  11*   ALT  19 Assessment / Plan:   · POD#1 lap appendectomy  · PO pain meds  · Diet as tolerated  · OOB  · Discharge today

## 2017-08-22 NOTE — PROGRESS NOTES
TRANSFER - IN REPORT:    Verbal report received from Yesika Negron RN(name) on Jenn Prom  being received from pacu(unit) for routine post - op      Report consisted of patients Situation, Background, Assessment and   Recommendations(SBAR). Information from the following report(s) SBAR, Kardex, Procedure Summary, Intake/Output, MAR and Recent Results was reviewed with the receiving nurse. Opportunity for questions and clarification was provided. Assessment completed upon patients arrival to unit and care assumed.

## 2017-08-23 LAB
BACTERIA SPEC CULT: NORMAL
CC UR VC: NORMAL
SERVICE CMNT-IMP: NORMAL

## 2018-02-16 ENCOUNTER — TELEPHONE (OUTPATIENT)
Dept: INTERNAL MEDICINE CLINIC | Age: 33
End: 2018-02-16

## 2018-02-16 NOTE — TELEPHONE ENCOUNTER
----- Message from Kenyetta Lewis sent at 2/16/2018  7:43 AM EST -----  Regarding: ELEANORrPot/Telephone  Pt is requesting a appointment today, she might have possible flu, she has symptoms. Best contact number is [phone].

## 2018-06-07 ENCOUNTER — OFFICE VISIT (OUTPATIENT)
Dept: INTERNAL MEDICINE CLINIC | Age: 33
End: 2018-06-07

## 2018-06-07 VITALS
DIASTOLIC BLOOD PRESSURE: 63 MMHG | TEMPERATURE: 98.3 F | RESPIRATION RATE: 12 BRPM | BODY MASS INDEX: 23.15 KG/M2 | HEIGHT: 64 IN | WEIGHT: 135.6 LBS | OXYGEN SATURATION: 97 % | SYSTOLIC BLOOD PRESSURE: 115 MMHG | HEART RATE: 76 BPM

## 2018-06-07 DIAGNOSIS — Z00.00 WELL WOMAN EXAM (NO GYNECOLOGICAL EXAM): Primary | ICD-10-CM

## 2018-06-07 DIAGNOSIS — G44.229 CHRONIC TENSION-TYPE HEADACHE, NOT INTRACTABLE: ICD-10-CM

## 2018-06-07 RX ORDER — CLINDAMYCIN PHOSPHATE 10 UG/ML
LOTION TOPICAL
COMMUNITY
Start: 2018-04-12 | End: 2020-10-23

## 2018-06-07 RX ORDER — ECONAZOLE NITRATE 10 MG/G
CREAM TOPICAL
COMMUNITY
Start: 2018-04-30

## 2018-06-07 RX ORDER — SUMATRIPTAN 50 MG/1
50 TABLET, FILM COATED ORAL
Qty: 12 TAB | Refills: 2 | Status: SHIPPED | OUTPATIENT
Start: 2018-06-07 | End: 2019-09-17 | Stop reason: SDUPTHER

## 2018-06-07 RX ORDER — TRETINOIN 0.25 MG/G
CREAM TOPICAL
COMMUNITY

## 2018-06-07 NOTE — PATIENT INSTRUCTIONS

## 2018-06-07 NOTE — PROGRESS NOTES
Subjective:   26 y.o. female for Well Woman Check. Her gyne and breast care is done elsewhere by her Ob-Gyne physician. Sees Dr Brandon Patel and is up to date on her pap smear; has IUD. Eats healthy diet and exercises with [employer] video with cardio and light weights. Has history of migraine headaches that occur once every 3-4 months and has responded well to Imitrex in the past.  Denies visual aura but has nausea and photosensitivity. Current Outpatient Prescriptions   Medication Sig Dispense Refill    clindamycin (CLEOCIN T) 1 % lotion       econazole nitrate (SPECTAZOLE) 1 % topical cream       tretinoin (RETIN-A) 0.025 % topical cream Apply  to affected area nightly.  SUMAtriptan (IMITREX) 50 mg tablet Take 1 Tab by mouth once as needed for Migraine for up to 1 dose. May repeat in 2 hours with 2nd tablet; max 2 tabs in 24 hours 12 Tab 2    fluticasone propionate (FLONASE NA) by Nasal route as needed.  levonorgestrel (MIRENA) 20 mcg/24 hr (5 years) IUD 1 Each by IntraUTERine route once. No Known Allergies  Past Medical History:   Diagnosis Date    Chronic headache disorder     occurs w changes in weather, barometric pressure    Hives 9/2014    during URI. took amoxicillin prior, but took it several times before and was ok    Irregular menses     improved w OCP    Onychomycosis     Dr. Sofia Oro [facility])    Patellofemoral joint pain     was runner. saw PT.        Past Surgical History:   Procedure Laterality Date    HX APPENDECTOMY  08/2017    HX WISDOM TEETH EXTRACTION       Family History   Problem Relation Age of Onset    Diabetes Mother     Heart Disease Mother 39    Hypertension Mother     High Cholesterol Mother     Stroke Mother     No Known Problems Son     Cancer Paternal Grandmother      lung    Heart Attack Paternal Grandmother     Cancer Paternal Grandfather      liver    Breast Cancer Other      breast, maternal aunt    Hypertension Maternal Grandmother     Heart Attack Maternal Grandmother     Cancer Paternal Aunt      breast cancer    No Known Problems Daughter      Social History   Substance Use Topics    Smoking status: Never Smoker    Smokeless tobacco: Never Used    Alcohol use Yes      Comment: occasionally             ROS: Feeling generally well. No TIA's or unusual headaches, no dysphagia. No prolonged cough. No dyspnea or chest pain on exertion. No abdominal pain, change in bowel habits, black or bloody stools. No urinary tract symptoms. No new or unusual musculoskeletal symptoms. Specific concerns today: none. Objective: The patient appears well, alert, oriented x 3, in no distress. Visit Vitals    /63 (BP 1 Location: Left arm, BP Patient Position: Sitting)    Pulse 76    Temp 98.3 °F (36.8 °C) (Oral)    Resp 12    Ht 5' 3.75\" (1.619 m)    Wt 135 lb 9.6 oz (61.5 kg)    SpO2 97%    BMI 23.46 kg/m2     ENT normal.  Neck supple. No adenopathy or thyromegaly. SARITHA. Lungs are clear, good air entry, no wheezes, rhonchi or rales. S1 and S2 normal, no murmurs, regular rate and rhythm. Abdomen soft without tenderness, guarding, mass or organomegaly. Extremities show no edema, normal peripheral pulses. Neurological is normal, no focal findings. Breast and Pelvic exams are deferred. Assessment/Plan:   Well Woman  continue present plan, routine labs ordered  Diagnoses and all orders for this visit:    1. Well woman exam (no gynecological exam)  -     CBC WITH AUTOMATED DIFF  -     LIPID PANEL  -     METABOLIC PANEL, COMPREHENSIVE  -     TSH 3RD GENERATION  -     T4, FREE  -     URINALYSIS W/ RFLX MICROSCOPIC    2. Chronic tension-type headache, not intractable  -     SUMAtriptan (IMITREX) 50 mg tablet; Take 1 Tab by mouth once as needed for Migraine for up to 1 dose.  May repeat in 2 hours with 2nd tablet; max 2 tabs in 24 hours    Stewart Fernandez was counseled on age-appropriate/ guideline-based risk prevention behaviors and screening for a 26y.o. year old   female . We also discussed adjustments in screening based on family history if necessary. Printed instructions for preventative screening guidelines and healthy behaviors given to patient with after visit summary.         lab results and schedule of future lab studies reviewed with patient  reviewed diet, exercise and weight control  reviewed medications and side effects in detail

## 2018-06-07 NOTE — MR AVS SNAPSHOT
Diane Breeding 
 
 
 [address] [place] [address] [facility] 
[phone] Patient: Jorge Julio 
MRN: [id number] VUM:[birth year] Visit Information Date & Time Provider Department Dept. Phone Encounter #  
 6/7/2018  9:00 AM Christine Sanchez NP [facility] [address] [id number] Upcoming Health Maintenance Date Due  
 PAP AKA CERVICAL CYTOLOGY 2/1/2016 Influenza Age 5 to Adult 8/1/2018 DTaP/Tdap/Td series (2 - Td) 1/2/2025 Allergies as of 6/7/2018  Review Complete On: 6/7/2018 By: Christine Sanchez NP No Known Allergies Current Immunizations  Reviewed on 6/7/2018 Name Date Influenza Vaccine 10/15/2015 Tdap 1/2/2015 Reviewed by Christine Sanhcez NP on 6/7/2018 at  9:34 AM  
You Were Diagnosed With   
  
 Codes Comments Well woman exam (no gynecological exam)    -  Primary ICD-10-CM: Z00.00 ICD-9-CM: V70.0 Chronic tension-type headache, not intractable     ICD-10-CM: O63.712 ICD-9-CM: 339.12 Vitals BP Pulse Temp Resp Height(growth percentile) Weight(growth percentile)  
 115/63 (BP 1 Location: Left arm, BP Patient Position: Sitting) 76 98.3 °F (36.8 °C) (Oral) 12 5' 3.75\" (1.619 m) 135 lb 9.6 oz (61.5 kg) SpO2 BMI OB Status Smoking Status 97% 23.46 kg/m2 IUD Never Smoker BMI and BSA Data Body Mass Index Body Surface Area  
 23.46 kg/m 2 1.66 m 2 Preferred Pharmacy Pharmacy Name Phone [facility] [place] [facility] Francine Tranqu[place] [phone] Your Updated Medication List  
  
   
This list is accurate as of 6/7/18  9:48 AM.  Always use your most recent med list.  
  
  
  
  
 clindamycin 1 % lotion Commonly known as:  CLEOCIN T  
  
 econazole nitrate 1 % topical cream  
Commonly known as:  Kahlil Motto FLONASE NA  
by Nasal route as needed.   
  
 levonorgestrel 20 mcg/24 hr (5 years) Iud  
 Commonly known as:  MIRENA  
1 Each by IntraUTERine route once. SUMAtriptan 50 mg tablet Commonly known as:  IMITREX Take 1 Tab by mouth once as needed for Migraine for up to 1 dose. May repeat in 2 hours with 2nd tablet; max 2 tabs in 24 hours  
  
 tretinoin 0.025 % topical cream  
Commonly known as:  RETIN-A Apply  to affected area nightly. Prescriptions Printed Refills SUMAtriptan (IMITREX) 50 mg tablet 2 Sig: Take 1 Tab by mouth once as needed for Migraine for up to 1 dose. May repeat in 2 hours with 2nd tablet; max 2 tabs in 24 hours Class: Print Route: Oral  
  
We Performed the Following CBC WITH AUTOMATED DIFF [62403 CPT(R)] LIPID PANEL [22406 CPT(R)] METABOLIC PANEL, COMPREHENSIVE [05730 CPT(R)] T4, FREE [id number] CPT(R)] TSH 3RD GENERATION [87130 CPT(R)] URINALYSIS W/ RFLX MICROSCOPIC [01340 CPT(R)] Patient Instructions Well Visit, Ages 25 to 48: Care Instructions Your Care Instructions Physical exams can help you stay healthy. Your doctor has checked your overall health and may have suggested ways to take good care of yourself. He or she also may have recommended tests. At home, you can help prevent illness with healthy eating, regular exercise, and other steps. Follow-up care is a key part of your treatment and safety. Be sure to make and go to all appointments, and call your doctor if you are having problems. It's also a good idea to know your test results and keep a list of the medicines you take. How can you care for yourself at home? · Reach and stay at a healthy weight. This will lower your risk for many problems, such as obesity, diabetes, heart disease, and high blood pressure. · Get at least 30 minutes of physical activity on most days of the week. Walking is a good choice. You also may want to do other activities, such as running, swimming, cycling, or playing tennis or team sports.  Discuss any changes in your exercise program with your doctor. · Do not smoke or allow others to smoke around you. If you need help quitting, talk to your doctor about stop-smoking programs and medicines. These can increase your chances of quitting for good. · Talk to your doctor about whether you have any risk factors for sexually transmitted infections (STIs). Having one sex partner (who does not have STIs and does not have sex with anyone else) is a good way to avoid these infections. · Use birth control if you do not want to have children at this time. Talk with your doctor about the choices available and what might be best for you. · Protect your skin from too much sun. When you're outdoors from 10 a.m. to 4 p.m., stay in the shade or cover up with clothing and a hat with a wide brim. Wear sunglasses that block UV rays. Even when it's cloudy, put broad-spectrum sunscreen (SPF 30 or higher) on any exposed skin. · See a dentist one or two times a year for checkups and to have your teeth cleaned. · Wear a seat belt in the car. · Drink alcohol in moderation, if at all. That means no more than 2 drinks a day for men and 1 drink a day for women. Follow your doctor's advice about when to have certain tests. These tests can spot problems early. For everyone · Cholesterol. Have the fat (cholesterol) in your blood tested after age 21. Your doctor will tell you how often to have this done based on your age, family history, or other things that can increase your risk for heart disease. · Blood pressure. Have your blood pressure checked during a routine doctor visit. Your doctor will tell you how often to check your blood pressure based on your age, your blood pressure results, and other factors. · Vision. Talk with your doctor about how often to have a glaucoma test. 
· Diabetes. Ask your doctor whether you should have tests for diabetes. · Colon cancer. Have a test for colon cancer at age 48.  You may have one of several tests. If you are younger than 48, you may need a test earlier if you have any risk factors. Risk factors include whether you already had a precancerous polyp removed from your colon or whether your parent, brother, sister, or child has had colon cancer. For women · Breast exam and mammogram. Talk to your doctor about when you should have a clinical breast exam and a mammogram. Medical experts differ on whether and how often women under 50 should have these tests. Your doctor can help you decide what is right for you. · Pap test and pelvic exam. Begin Pap tests at age 24. A Pap test is the best way to find cervical cancer. The test often is part of a pelvic exam. Ask how often to have this test. 
· Tests for sexually transmitted infections (STIs). Ask whether you should have tests for STIs. You may be at risk if you have sex with more than one person, especially if your partners do not wear condoms. For men · Tests for sexually transmitted infections (STIs). Ask whether you should have tests for STIs. You may be at risk if you have sex with more than one person, especially if you do not wear a condom. · Testicular cancer exam. Ask your doctor whether you should check your testicles regularly. · Prostate exam. Talk to your doctor about whether you should have a blood test (called a PSA test) for prostate cancer. Experts differ on whether and when men should have this test. Some experts suggest it if you are older than 39 and are -American or have a father or brother who got prostate cancer when he was younger than 72. When should you call for help? Watch closely for changes in your health, and be sure to contact your doctor if you have any problems or symptoms that concern you. Where can you learn more? Go to http://balbir-tosha.info/. Enter P072 in the search box to learn more about \"Well Visit, Ages 25 to 48: Care Instructions. \" Current as of: May 12, 2017 Content Version: 11.4 © 2285-8015 [employer]. Care instructions adapted under license by [employer] (which disclaims liability or warranty for this information). If you have questions about a medical condition or this instruction, always ask your healthcare professional. [address] any warranty or liability for your use of this information. Introducing hospitals & HEALTH SERVICES! Dear [place]: Thank you for requesting a [employer] account. Our records indicate that you already have an active [employer] account. You can access your account anytime at [email or web address]://[email or web address]. [employer] Did you know that you can access your hospital and ER discharge instructions at any time in [employer]? You can also review all of your test results from your hospital stay or ER visit. Additional Information If you have questions, please visit the Frequently Asked Questions section of the [employer] website at [email or web address]/. Remember, [employer] is NOT to be used for urgent needs. For medical emergencies, dial 911. Now available from your iPhone and Android! Please provide this summary of care documentation to your next provider. Your primary care clinician is listed as Karlo Coleman. If you have any questions after today's visit, please call [phone].

## 2018-06-09 LAB
ALBUMIN SERPL-MCNC: 4.7 G/DL (ref 3.5–5.5)
ALBUMIN/GLOB SERPL: 1.9 {RATIO} (ref 1.2–2.2)
ALP SERPL-CCNC: 51 IU/L (ref 39–117)
ALT SERPL-CCNC: 15 IU/L (ref 0–32)
APPEARANCE UR: CLEAR
AST SERPL-CCNC: 18 IU/L (ref 0–40)
BACTERIA #/AREA URNS HPF: ABNORMAL /[HPF]
BASOPHILS # BLD AUTO: 0 X10E3/UL (ref 0–0.2)
BASOPHILS NFR BLD AUTO: 1 %
BILIRUB SERPL-MCNC: 0.7 MG/DL (ref 0–1.2)
BILIRUB UR QL STRIP: NEGATIVE
BUN SERPL-MCNC: 19 MG/DL (ref 6–20)
BUN/CREAT SERPL: 25 (ref 9–23)
CALCIUM SERPL-MCNC: 9.2 MG/DL (ref 8.7–10.2)
CASTS URNS QL MICRO: ABNORMAL /LPF
CHLORIDE SERPL-SCNC: 103 MMOL/L (ref 96–106)
CHOLEST SERPL-MCNC: 141 MG/DL (ref 100–199)
CO2 SERPL-SCNC: 22 MMOL/L (ref 18–29)
COLOR UR: YELLOW
CREAT SERPL-MCNC: 0.77 MG/DL (ref 0.57–1)
EOSINOPHIL # BLD AUTO: 0.1 X10E3/UL (ref 0–0.4)
EOSINOPHIL NFR BLD AUTO: 2 %
EPI CELLS #/AREA URNS HPF: ABNORMAL /HPF
ERYTHROCYTE [DISTWIDTH] IN BLOOD BY AUTOMATED COUNT: 13.3 % (ref 12.3–15.4)
GFR SERPLBLD CREATININE-BSD FMLA CKD-EPI: 100 ML/MIN/1.73
GFR SERPLBLD CREATININE-BSD FMLA CKD-EPI: 116 ML/MIN/1.73
GLOBULIN SER CALC-MCNC: 2.5 G/DL (ref 1.5–4.5)
GLUCOSE SERPL-MCNC: 92 MG/DL (ref 65–99)
GLUCOSE UR QL: NEGATIVE
HCT VFR BLD AUTO: 45.7 % (ref 34–46.6)
HDLC SERPL-MCNC: 69 MG/DL
HGB BLD-MCNC: 15.3 G/DL (ref 11.1–15.9)
HGB UR QL STRIP: NEGATIVE
IMM GRANULOCYTES # BLD: 0 X10E3/UL (ref 0–0.1)
IMM GRANULOCYTES NFR BLD: 0 %
INTERPRETATION, 910389: NORMAL
KETONES UR QL STRIP: NEGATIVE
LDLC SERPL CALC-MCNC: 57 MG/DL (ref 0–99)
LEUKOCYTE ESTERASE UR QL STRIP: ABNORMAL
LYMPHOCYTES # BLD AUTO: 2.7 X10E3/UL (ref 0.7–3.1)
LYMPHOCYTES NFR BLD AUTO: 37 %
MCH RBC QN AUTO: 29.8 PG (ref 26.6–33)
MCHC RBC AUTO-ENTMCNC: 33.5 G/DL (ref 31.5–35.7)
MCV RBC AUTO: 89 FL (ref 79–97)
MICRO URNS: ABNORMAL
MONOCYTES # BLD AUTO: 0.6 X10E3/UL (ref 0.1–0.9)
MONOCYTES NFR BLD AUTO: 8 %
NEUTROPHILS # BLD AUTO: 3.9 X10E3/UL (ref 1.4–7)
NEUTROPHILS NFR BLD AUTO: 52 %
NITRITE UR QL STRIP: NEGATIVE
PH UR STRIP: 7 [PH] (ref 5–7.5)
PLATELET # BLD AUTO: 229 X10E3/UL (ref 150–379)
POTASSIUM SERPL-SCNC: 4.4 MMOL/L (ref 3.5–5.2)
PROT SERPL-MCNC: 7.2 G/DL (ref 6–8.5)
PROT UR QL STRIP: NEGATIVE
RBC # BLD AUTO: 5.13 X10E6/UL (ref 3.77–5.28)
RBC #/AREA URNS HPF: ABNORMAL /HPF
SODIUM SERPL-SCNC: 139 MMOL/L (ref 134–144)
SP GR UR: 1.02 (ref 1–1.03)
T4 FREE SERPL-MCNC: 1.31 NG/DL (ref 0.82–1.77)
TRIGL SERPL-MCNC: 74 MG/DL (ref 0–149)
TSH SERPL DL<=0.005 MIU/L-ACNC: 0.76 UIU/ML (ref 0.45–4.5)
UROBILINOGEN UR STRIP-MCNC: 0.2 MG/DL (ref 0.2–1)
VLDLC SERPL CALC-MCNC: 15 MG/DL (ref 5–40)
WBC # BLD AUTO: 7.4 X10E3/UL (ref 3.4–10.8)
WBC #/AREA URNS HPF: ABNORMAL /HPF

## 2018-08-08 ENCOUNTER — OFFICE VISIT (OUTPATIENT)
Dept: INTERNAL MEDICINE CLINIC | Age: 33
End: 2018-08-08

## 2018-08-08 VITALS
SYSTOLIC BLOOD PRESSURE: 121 MMHG | WEIGHT: 137 LBS | BODY MASS INDEX: 23.39 KG/M2 | TEMPERATURE: 98.4 F | OXYGEN SATURATION: 97 % | HEIGHT: 64 IN | HEART RATE: 84 BPM | DIASTOLIC BLOOD PRESSURE: 67 MMHG | RESPIRATION RATE: 16 BRPM

## 2018-08-08 DIAGNOSIS — G93.31 POST VIRAL SYNDROME: ICD-10-CM

## 2018-08-08 DIAGNOSIS — R05.9 COUGH: Primary | ICD-10-CM

## 2018-08-08 RX ORDER — PREDNISONE 20 MG/1
TABLET ORAL
Qty: 9 TAB | Refills: 0 | Status: SHIPPED | OUTPATIENT
Start: 2018-08-08 | End: 2018-09-20 | Stop reason: ALTCHOICE

## 2018-08-08 RX ORDER — BENZONATATE 200 MG/1
200 CAPSULE ORAL
Qty: 21 CAP | Refills: 0 | Status: SHIPPED | OUTPATIENT
Start: 2018-08-08 | End: 2018-08-15

## 2018-08-08 NOTE — PROGRESS NOTES
Stacy Kahn is a 26 y.o. female who presents today for Cough; Nasal Congestion; and Nausea  . She has a history of   Patient Active Problem List   Diagnosis Code    Hives L50.9    Irregular menses N92.6    Chronic headache disorder R51    Onychomycosis B35.1    Patellofemoral joint pain M25.569    Pregnancy Z34.90    Appendicitis K37    Acute appendicitis K35.80   . Today patient is here for an acute visit. Upper respiratory illness:  Stacy Kahn presents with complaints of congestion, sore throat, productive cough and hoarseness for 3 weeks. Cough has been getting much worse. Mild  nausea and no vomiting . she has not had  fever, chills and pain while swallowing. Symptoms are moderate. Over-the-counter remedies including Nothing. Drinking plenty of fluids: yes  Asthma?:  no  non-smoker  Contacts with similar infections: yes       ROS  Review of Systems   Constitutional: Negative for chills, fever and weight loss. HENT: Positive for congestion, sinus pain and sore throat. Negative for ear pain, hearing loss, nosebleeds and tinnitus. Respiratory: Positive for cough and sputum production. Negative for hemoptysis, shortness of breath, wheezing and stridor. Cardiovascular: Negative for chest pain, palpitations, orthopnea and leg swelling. Gastrointestinal: Positive for nausea. Negative for abdominal pain, constipation, diarrhea, heartburn and vomiting. Genitourinary: Negative for dysuria, frequency, hematuria and urgency. Neurological: Negative. Endo/Heme/Allergies: Does not bruise/bleed easily. Psychiatric/Behavioral: Negative for depression. The patient is not nervous/anxious.         Visit Vitals    /67 (BP 1 Location: Left arm, BP Patient Position: Sitting)    Pulse 84    Temp 98.4 °F (36.9 °C) (Oral)    Resp 16    Ht 5' 3.75\" (1.619 m)    Wt 137 lb (62.1 kg)    SpO2 97%    BMI 23.7 kg/m2       Physical Exam   Constitutional: She is oriented to person, place, and time. She appears well-developed and well-nourished. HENT:   Head: Normocephalic and atraumatic. Right Ear: External ear normal.   Left Ear: External ear normal.   Mouth/Throat: No oropharyngeal exudate (Irritated oropharynx. ). Fluid behind both TM. Eyes: EOM are normal. Pupils are equal, round, and reactive to light. Cardiovascular: Normal rate and regular rhythm. No murmur heard. Pulmonary/Chest: Effort normal and breath sounds normal. No respiratory distress. She has no wheezes. No Egophony. Harsh cough. Abdominal: Soft. Bowel sounds are normal. She exhibits no distension. Neurological: She is alert and oriented to person, place, and time. Skin: Skin is warm and dry. Psychiatric: She has a normal mood and affect. Her behavior is normal.         Current Outpatient Prescriptions   Medication Sig    clindamycin (CLEOCIN T) 1 % lotion     econazole nitrate (SPECTAZOLE) 1 % topical cream     tretinoin (RETIN-A) 0.025 % topical cream Apply  to affected area nightly.  SUMAtriptan (IMITREX) 50 mg tablet Take 1 Tab by mouth once as needed for Migraine for up to 1 dose. May repeat in 2 hours with 2nd tablet; max 2 tabs in 24 hours    fluticasone propionate (FLONASE NA) by Nasal route as needed.  levonorgestrel (MIRENA) 20 mcg/24 hr (5 years) IUD 1 Each by IntraUTERine route once. No current facility-administered medications for this visit. Past Medical History:   Diagnosis Date    Chronic headache disorder     occurs w changes in weather, barometric pressure    Hives 9/2014    during URI. took amoxicillin prior, but took it several times before and was ok    Irregular menses     improved w OCP    Onychomycosis     Dr. [facility])    Patellofemoral joint pain     was runner. saw PT.         Past Surgical History:   Procedure Laterality Date    HX APPENDECTOMY  08/2017    HX WISDOM TEETH EXTRACTION        Social History   Substance Use Topics  Smoking status: Never Smoker    Smokeless tobacco: Never Used    Alcohol use Yes      Comment: occasionally      Family History   Problem Relation Age of Onset    Diabetes Mother     Heart Disease Mother 39    Hypertension Mother     High Cholesterol Mother     Stroke Mother     No Known Problems Son     Cancer Paternal Grandmother      lung    Heart Attack Paternal Grandmother     Cancer Paternal Grandfather      liver    Breast Cancer Other      breast, maternal aunt    Hypertension Maternal Grandmother     Heart Attack Maternal Grandmother     Cancer Paternal Aunt      breast cancer    No Known Problems Daughter         No Known Allergies     Assessment/Plan  Diagnoses and all orders for this visit:    1. Cough - Post viral cough. No s/s of bacterial infection. Mucinex and short course of steroids. -     predniSONE (DELTASONE) 20 mg tablet; Take two tablets for 3 days then one for 3 days then stop  -     guaiFENesin (MUCINEX) 1,200 mg Ta12 ER tablet; Take 1 Tab by mouth two (2) times a day for 3 days. -     benzonatate (TESSALON) 200 mg capsule; Take 1 Cap by mouth three (3) times daily as needed for Cough for up to 7 days. 2. Post viral syndrome  -     predniSONE (DELTASONE) 20 mg tablet; Take two tablets for 3 days then one for 3 days then stop  -     guaiFENesin (MUCINEX) 1,200 mg Ta12 ER tablet; Take 1 Tab by mouth two (2) times a day for 3 days. -     benzonatate (TESSALON) 200 mg capsule; Take 1 Cap by mouth three (3) times daily as needed for Cough for up to 7 days.         Follow-up Disposition: Not on File    Frances Simmonds, MD  8/8/2018

## 2018-08-08 NOTE — MR AVS SNAPSHOT
[facility] 
 
 
 [address] [facility] 
[phone] Patient: Conner Calloway 
MRN: [id number] PLO:0/1/6319 Visit Information Date & Time Provider Department Dept. Phone Encounter #  
 8/8/2018  8:00 AM Frances Simmonds, MD [facility] [address] [id number] Upcoming Health Maintenance Date Due  
 PAP AKA CERVICAL CYTOLOGY 2/1/2016 Influenza Age 5 to Adult 8/1/2018 DTaP/Tdap/Td series (2 - Td) 1/2/2025 Allergies as of 8/8/2018  Review Complete On: 4/3/7504 By: Regina Weber No Known Allergies Current Immunizations  Reviewed on 6/7/2018 Name Date Influenza Vaccine 10/15/2015 Tdap 1/2/2015 Not reviewed this visit You Were Diagnosed With   
  
 Codes Comments Cough    -  Primary ICD-10-CM: M10 ICD-9-CM: 786.2 Post viral syndrome     ICD-10-CM: G93.3 ICD-9-CM: 780.79 Vitals BP Pulse Temp Resp Height(growth percentile) Weight(growth percentile) 121/67 (BP 1 Location: Left arm, BP Patient Position: Sitting) 84 98.4 °F (36.9 °C) (Oral) 16 5' 3.75\" (1.619 m) 137 lb (62.1 kg) SpO2 BMI OB Status Smoking Status 97% 23.7 kg/m2 IUD Never Smoker Vitals History BMI and BSA Data Body Mass Index Body Surface Area  
 23.7 kg/m 2 1.67 m 2 Preferred Pharmacy [facility] [address] [place] [address] [place], [place] [phone] Your Updated Medication List  
  
   
This list is accurate as of 8/8/18  8:19 AM.  Always use your most recent med list.  
  
  
  
  
 benzonatate 200 mg capsule Commonly known as:  TESSALON Take 1 Cap by mouth three (3) times daily as needed for Cough for up to 7 days. clindamycin 1 % lotion Commonly known as:  CLEOCIN T  
  
 econazole nitrate 1 % topical cream  
Commonly known as:  Dexter Ghazala FLONASE NA  
by Nasal route as needed. guaiFENesin 1,200 mg Ta12 ER tablet Commonly known as:  Joshua & Joshua Take 1 Tab by mouth two (2) times a day for 3 days. levonorgestrel 20 mcg/24 hr (5 years) Iud  
Commonly known as:  MIRENA  
1 Each by IntraUTERine route once. predniSONE 20 mg tablet Commonly known as:  Alinda Heather Take two tablets for 3 days then one for 3 days then stop SUMAtriptan 50 mg tablet Commonly known as:  IMITREX Take 1 Tab by mouth once as needed for Migraine for up to 1 dose. May repeat in 2 hours with 2nd tablet; max 2 tabs in 24 hours  
  
 tretinoin 0.025 % topical cream  
Commonly known as:  RETIN-A Apply  to affected area nightly. Prescriptions Sent to Pharmacy Refills  
 predniSONE (DELTASONE) 20 mg tablet 0 Sig: Take two tablets for 3 days then one for 3 days then stop Class: Normal  
 Pharmacy: [facility] [address] Ph #: [phone]  
 guaiFENesin (MUCINEX) 1,200 mg Ta12 ER tablet 0 Sig: Take 1 Tab by mouth two (2) times a day for 3 days. Class: Normal  
 Pharmacy: [address] [place] [address] Ph #: [phone] Route: Oral  
 benzonatate (TESSALON) 200 mg capsule 0 Sig: Take 1 Cap by mouth three (3) times daily as needed for Cough for up to 7 days. Class: Normal  
 Pharmacy: [address] [place] [address] Ph #: [phone] Route: Oral  
  
Introducing [facility] SERVICES! Dear Dimas Elmore: Thank you for requesting a [employer] account. Our records indicate that you already have an active [employer] account. You can access your account anytime at [email or web address] Energy/Vivity Labs Did you know that you can access your hospital and ER discharge instructions at any time in [employer]? You can also review all of your test results from your hospital stay or ER visit. Additional Information If you have questions, please visit the Frequently Asked Questions section of the [email or web address] website at [email or web address] Remember, SET is NOT to be used for urgent needs. For medical emergencies, dial 911. Now available from your iPhone and Android! Please provide this summary of care documentation to your next provider. Your primary care clinician is listed as Ingrid Blanchard. If you have any questions after today's visit, please call [phone].

## 2018-09-20 ENCOUNTER — OFFICE VISIT (OUTPATIENT)
Dept: INTERNAL MEDICINE CLINIC | Age: 33
End: 2018-09-20

## 2018-09-20 VITALS
SYSTOLIC BLOOD PRESSURE: 111 MMHG | OXYGEN SATURATION: 99 % | DIASTOLIC BLOOD PRESSURE: 45 MMHG | WEIGHT: 138 LBS | TEMPERATURE: 97.9 F | BODY MASS INDEX: 23.56 KG/M2 | HEIGHT: 64 IN | HEART RATE: 60 BPM | RESPIRATION RATE: 18 BRPM

## 2018-09-20 DIAGNOSIS — G57.02 PIRIFORMIS SYNDROME OF LEFT SIDE: ICD-10-CM

## 2018-09-20 DIAGNOSIS — R09.81 SINUS CONGESTION: Primary | ICD-10-CM

## 2018-09-20 DIAGNOSIS — M54.32 SCIATICA OF LEFT SIDE: ICD-10-CM

## 2018-09-20 RX ORDER — METHYLPREDNISOLONE 4 MG/1
TABLET ORAL
Qty: 1 DOSE PACK | Refills: 0 | Status: SHIPPED | OUTPATIENT
Start: 2018-09-20 | End: 2019-11-21

## 2018-09-20 RX ORDER — MONTELUKAST SODIUM 10 MG/1
10 TABLET ORAL DAILY
Qty: 30 TAB | Refills: 5 | Status: SHIPPED | OUTPATIENT
Start: 2018-09-20 | End: 2019-11-21

## 2018-09-20 NOTE — MR AVS SNAPSHOT
[address] [facility] 
 
 
 [address] 
[phone] Patient: Sil Jha 
MRN: [id number] CSS:1/4/0634 Visit Information Date & Time Provider Department Dept. Phone Encounter #  
 9/20/2018 10:45 AM Oliver Still MD [facility] [address] [facility] [id number] Upcoming Health Maintenance Date Due  
 PAP AKA CERVICAL CYTOLOGY 2/1/2016 Influenza Age 5 to Adult 8/1/2018 DTaP/Tdap/Td series (2 - Td) 1/2/2025 Allergies as of 9/20/2018  Review Complete On: 9/20/2018 By: Oliver Still MD  
 No Known Allergies Current Immunizations  Reviewed on 6/7/2018 Name Date Influenza Vaccine 10/15/2015 Tdap 1/2/2015 Not reviewed this visit You Were Diagnosed With   
  
 Codes Comments Sinus congestion    -  Primary ICD-10-CM: R09.81 ICD-9-CM: 478.19 Sciatica of left side     ICD-10-CM: M54.32 
ICD-9-CM: 724.3 Piriformis syndrome of left side     ICD-10-CM: G57.02 
ICD-9-CM: 355.0 Vitals BP Pulse Temp Resp Height(growth percentile) Weight(growth percentile) 111/45 (BP 1 Location: Left arm, BP Patient Position: Sitting) 60 97.9 °F (36.6 °C) (Oral) 18 5' 3.7\" (1.618 m) 138 lb (62.6 kg) SpO2 Breastfeeding? BMI OB Status Smoking Status 99% Yes 23.91 kg/m2 IUD Never Smoker Vitals History BMI and BSA Data Body Mass Index Body Surface Area  
 23.91 kg/m 2 1.68 m 2 Preferred Pharmacy Pharmacy Name Phone [facility] [place] [facility] [phone] Your Updated Medication List  
  
   
This list is accurate as of 9/20/18 11:09 AM.  Always use your most recent med list.  
  
  
  
  
 clindamycin 1 % lotion Commonly known as:  CLEOCIN T  
  
 econazole nitrate 1 % topical cream  
Commonly known as:  Remona Pall FLONASE NA  
by Nasal route as needed.   
  
 levonorgestrel 20 mcg/24 hr (5 years) Iud  
 Commonly known as:  MIRENA  
1 Each by IntraUTERine route once. methylPREDNISolone 4 mg tablet Commonly known as:  Hodan Mcberg Per pack  
  
 montelukast 10 mg tablet Commonly known as:  SINGULAIR Take 1 Tab by mouth daily. SUMAtriptan 50 mg tablet Commonly known as:  IMITREX Take 1 Tab by mouth once as needed for Migraine for up to 1 dose. May repeat in 2 hours with 2nd tablet; max 2 tabs in 24 hours  
  
 tretinoin 0.025 % topical cream  
Commonly known as:  RETIN-A Apply  to affected area nightly. Prescriptions Sent to Pharmacy Refills  
 montelukast (SINGULAIR) 10 mg tablet 5 Sig: Take 1 Tab by mouth daily. Class: Normal  
 Pharmacy: [address] [place] [address] Ph #: [phone] Route: Oral  
 methylPREDNISolone (MEDROL DOSEPACK) 4 mg tablet 0 Sig: Per pack Class: Normal  
 Pharmacy: [address] [place] [address] Ph #: [phone] We Performed the Following REFERRAL TO PHYSICAL THERAPY [WXS21 Custom] Referral Information Referral ID Referred By Referred To  
  
 [id number] Angelita COLEMAN Not Available Visits Status Start Date End Date 1 New Request 9/20/18 9/20/19 If your referral has a status of pending review or denied, additional information will be sent to support the outcome of this decision. Introducing [facility] & HEALTH SERVICES! Dear Abel Evans: Thank you for requesting a [email or web address] account. Our records indicate that you already have an active [email or web address] account. You can access your account anytime at [email or web address] Did you know that you can access your hospital and ER discharge instructions at any time in [email or web address]? You can also review all of your test results from your hospital stay or ER visit. Additional Information If you have questions, please visit the Frequently Asked Questions section of the [employer] website at [email or web address] Remember, CultureIQ is NOT to be used for urgent needs. For medical emergencies, dial 911. Now available from your iPhone and Android! Please provide this summary of care documentation to your next provider. Your primary care clinician is listed as Abad Person. If you have any questions after today's visit, please call [phone].

## 2018-09-20 NOTE — PROGRESS NOTES
HISTORY OF PRESENT ILLNESS  Serge Torres is a 26 y.o. female. HPI  Seen with two concerns. She notes since August she's had ongoing cough, which never resolved, some mucus, some now thick phlegm despite using Mucinex and Flonase. Yesterday she began having right ear pain after floating in a salt water bath. She's not had fevers, chills or wheezing. History of piriformis syndrome on left. Recently has had left buttock discomfort, which is now radiating into left thigh. Her last weight training was last week. She thinks this may have exacerbated it. She's done PT in the past with good results. She's not having weakness or numbness in feet. She's not had acute injury. Review of Systems   Constitutional: Positive for malaise/fatigue. Negative for chills, diaphoresis, fever and weight loss. HENT: Positive for congestion, ear pain and sinus pain. Negative for ear discharge, hearing loss, sore throat and tinnitus. Eyes: Negative for blurred vision. Respiratory: Negative for cough, shortness of breath and wheezing. Cardiovascular: Negative for chest pain, palpitations, orthopnea, leg swelling and PND. Gastrointestinal: Negative for abdominal pain, heartburn and nausea. Genitourinary: Negative for dysuria. Musculoskeletal: Positive for back pain. Negative for falls and myalgias. Skin: Negative for rash. Neurological: Negative for dizziness, tingling, sensory change, focal weakness and headaches. Endo/Heme/Allergies: Positive for environmental allergies. Physical Exam   Constitutional: She is oriented to person, place, and time. She appears well-developed and well-nourished. HENT:   Head: Normocephalic. Right Ear: Tympanic membrane, external ear and ear canal normal.   Left Ear: Tympanic membrane, external ear and ear canal normal.   Nose: Nose normal.   Mouth/Throat: Oropharynx is clear and moist and mucous membranes are normal. No oropharyngeal exudate.    Eyes: Conjunctivae are normal. Pupils are equal, round, and reactive to light. Right eye exhibits no discharge. Left eye exhibits no discharge. Neck: Normal range of motion. Neck supple. Cardiovascular: Normal rate, regular rhythm and normal heart sounds. Pulmonary/Chest: Effort normal and breath sounds normal. No respiratory distress. She has no wheezes. She has no rales. Musculoskeletal: She exhibits tenderness (left piriformis area no vertebral point pain). She exhibits no edema. slr is neg strength intact in legs dtr knees 2 plus   Lymphadenopathy:     She has no cervical adenopathy. Neurological: She is alert and oriented to person, place, and time. Skin: No rash noted. Nursing note and vitals reviewed. ASSESSMENT and PLAN  Diagnoses and all orders for this visit:    1. Sinus congestion-no sign of acute infection but suspect chronic sinus pressure and  Congestion with referred pain to ear now  -     montelukast (SINGULAIR) 10 mg tablet; Take 1 Tab by mouth daily. -     methylPREDNISolone (MEDROL DOSEPACK) 4 mg tablet; Per pack    2.  Sciatica of left side  -     REFERRAL TO PHYSICAL THERAPY    3. Piriformis syndrome of left side  -     methylPREDNISolone (MEDROL DOSEPACK) 4 mg tablet; Per pack  -     REFERRAL TO PHYSICAL THERAPY    Side effects possible reviewed in detail  Avoid weight training until piriformis area no longer painful

## 2019-09-17 ENCOUNTER — OFFICE VISIT (OUTPATIENT)
Dept: INTERNAL MEDICINE CLINIC | Age: 34
End: 2019-09-17

## 2019-09-17 VITALS
WEIGHT: 132.38 LBS | RESPIRATION RATE: 18 BRPM | SYSTOLIC BLOOD PRESSURE: 102 MMHG | DIASTOLIC BLOOD PRESSURE: 76 MMHG | OXYGEN SATURATION: 100 % | HEART RATE: 66 BPM | HEIGHT: 64 IN | BODY MASS INDEX: 22.6 KG/M2 | TEMPERATURE: 98.9 F

## 2019-09-17 DIAGNOSIS — J01.01 ACUTE RECURRENT MAXILLARY SINUSITIS: Primary | ICD-10-CM

## 2019-09-17 DIAGNOSIS — G44.229 CHRONIC TENSION-TYPE HEADACHE, NOT INTRACTABLE: ICD-10-CM

## 2019-09-17 RX ORDER — SUMATRIPTAN 50 MG/1
50 TABLET, FILM COATED ORAL
Qty: 12 TAB | Refills: 2 | Status: SHIPPED | OUTPATIENT
Start: 2019-09-17 | End: 2019-09-17

## 2019-09-17 RX ORDER — CEFUROXIME AXETIL 500 MG/1
500 TABLET ORAL 2 TIMES DAILY
Qty: 20 TAB | Refills: 0 | Status: SHIPPED | OUTPATIENT
Start: 2019-09-17 | End: 2019-11-21

## 2019-09-17 NOTE — PATIENT INSTRUCTIONS
Deciding About Taking Medicine to Prevent Migraines  How can you decide about taking medicine to prevent migraine headaches? What are migraines? Migraines are painful, throbbing headaches. They can last from 4 to 72 hours. They often occur on only one side of your head. But you may feel them on both sides. The pain may keep you from doing your daily activities. You may take a daily medicine if you get bad migraines often. This can help prevent them. What are key points about this decision? · Medicines to prevent migraines may not stop them every time. But if you take them daily, you can reduce how many migraines you get by more than half. They can also reduce how long migraines last. And your symptoms may not be as bad. · Medicines that prevent migraines may cause side effects. You may have sleep and memory problems, upset stomach, dry mouth, or constipation. Some of these side effects may last for as long as you take the medicine. Or they may go away within a few weeks. Why might you choose to take medicine to prevent migraines? · You are willing to take medicine daily if it will help your symptoms. · You don't think the side effects of the medicine could be as bad as your migraine symptoms. · Your migraines get in the way of your work. Or they harm your relationships with friends and family. · Benefits of medicine include fewer or no migraines. And your migraines may not last as long or feel as bad. Why might you choose not to take medicine to prevent migraines? · You want to avoid the side effects of the medicine. · You don't want to take medicine every day. · Your migraines are not affecting your work and relationships. · If your symptoms don't improve with home treatment and other medicines, you can decide later to take medicine every day to help prevent migraines. Your decision  Thinking about the facts and your feelings can help you make a decision that is right for you.  Be sure you understand the benefits and risks of your options, and think about what else you need to do before you make the decision. Where can you learn more? Go to http://balbir-tosha.info/. Enter I082 in the search box to learn more about \"Deciding About Taking Medicine to Prevent Migraines. \"  Current as of: March 28, 2019  Content Version: 12.1  © 7364-6719 Healthwise, [employer]. Care instructions adapted under license by [employer] (which disclaims liability or warranty for this information). If you have questions about a medical condition or this instruction, always ask your healthcare professional. [place] [address] any warranty or liability for your use of this information.

## 2019-09-17 NOTE — PROGRESS NOTES
Chief Complaint   Patient presents with    Sinus Infection     Sinusitis  Presents with nasal blockage, post nasal drip and bilateral sinus pain for 3 days. Denies shortness of breath, chest pain, abdominal pain, nausea, vomiting,  sneezing. Symptoms are severe. Recent treatment for similar symptoms? None. Has tried over-the-counter remedies ibuprofen, Flonase and Singulair with mild and paritial relief of symptoms. Contacts with similar infections: no.  Asthma?:  no.   reports that she has never smoked. She has never used smokeless tobacco.  She reports green nasal drainage and pressure over her eyes and around her nose. Headaches  Patient reports that she has headaches pretty frequently. She notes she gets them about 4 times per month. She takes ibuprofen and they will resolve. If her headaches are very bad and she transitions into migraine she takes Imitrex. The Imitrex also seems to help her symptoms. She notes that her headaches are more barometric pressure related. She can tell when it is going to rain before the weather man calls it. She is not taking ibuprofen daily. Past Medical History:   Diagnosis Date    Chronic headache disorder     occurs w changes in weather, barometric pressure    Hives 9/2014    during URI. took amoxicillin prior, but took it several times before and was ok    Irregular menses     improved w OCP    Onychomycosis     Dr. Sunny Espinosa [facility])    Patellofemoral joint pain     was runner. saw PT.        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)     Employer: [employer]   Tobacco Use    Smoking status: Never Smoker    Smokeless tobacco: Never Used   Substance and Sexual Activity    Alcohol use: Yes     Comment: occasionally    Drug use: No    Sexual activity: Yes     Partners: Male     Birth control/protection: IUD     Family History   Problem Relation Age of Onset    Diabetes Mother     Heart Disease Mother 39    Hypertension Mother     High Cholesterol Mother     Stroke Mother     No Known Problems Son     Cancer Paternal Grandmother         lung    Heart Attack Paternal Grandmother     Cancer Paternal Grandfather         liver    Breast Cancer Other         breast, maternal aunt    Hypertension Maternal Grandmother     Heart Attack Maternal Grandmother     Cancer Paternal Aunt         breast cancer    No Known Problems Daughter      Current Outpatient Medications   Medication Sig Dispense Refill    montelukast (SINGULAIR) 10 mg tablet Take 1 Tab by mouth daily. 30 Tab 5    clindamycin (CLEOCIN T) 1 % lotion       econazole nitrate (SPECTAZOLE) 1 % topical cream       tretinoin (RETIN-A) 0.025 % topical cream Apply  to affected area nightly.  SUMAtriptan (IMITREX) 50 mg tablet Take 1 Tab by mouth once as needed for Migraine for up to 1 dose. May repeat in 2 hours with 2nd tablet; max 2 tabs in 24 hours 12 Tab 2    fluticasone propionate (FLONASE NA) by Nasal route as needed.  levonorgestrel (MIRENA) 20 mcg/24 hr (5 years) IUD 1 Each by IntraUTERine route once.       methylPREDNISolone (MEDROL DOSEPACK) 4 mg tablet Per pack 1 Dose Pack 0     No Known Allergies    Review of Systems - General ROS: negative for - chills or fever  Cardiovascular ROS: no chest pain or dyspnea on exertion  Respiratory ROS: no cough, shortness of breath, or wheezing    Visit Vitals  /76 (BP 1 Location: Left arm, BP Patient Position: Sitting)   Pulse 66   Temp 98.9 °F (37.2 °C) (Oral)   Resp 18   Ht 5' 3.7\" (1.618 m)   Wt 132 lb 6 oz (60 kg)   SpO2 100%   BMI 22.94 kg/m²     General Appearance:  Well developed, well nourished,alert and oriented x 3, and individual in no acute distress. Ears/Nose/Mouth/Throat:   Hearing grossly normal.  The pain on palpation of maxillary and frontal sinuses. Nasal turbinates within normal limits, right ear retracted tympanic membrane left ear fluid         Neck: Supple, no lad, no bruits   Chest:   Lungs clear to auscultation bilaterally. Cardiovascular:  Regular rate and rhythm, S1, S2 normal, no murmur. Abdomen:   Soft, non-tender, bowel sounds are active. Extremities: No edema bilaterally. Skin: Warm and dry, no suspicious lesions                 Diagnoses and all orders for this visit:    1. Acute recurrent maxillary sinusitis  Patient with acute sinusitis  Nasal irrigation with normal saline  Advil 600 mg every 6 hours for 3 days. -     cefUROXime (CEFTIN) 500 mg tablet; Take 1 Tab by mouth two (2) times a day. 2. Chronic tension-type headache, not intractable  Discussed with patient prophylaxis with Topamax/amitriptyline. She prefers not to be on daily medication for now. She will keep a headache diary and if frequent may consider prophylaxis. -     SUMAtriptan (IMITREX) 50 mg tablet; Take 1 Tab by mouth once as needed for Migraine for up to 1 dose.  May repeat in 2 hours with 2nd tablet; max 2 tabs in 24 hours

## 2019-11-21 ENCOUNTER — HOSPITAL ENCOUNTER (OUTPATIENT)
Dept: CT IMAGING | Age: 34
Discharge: HOME | End: 2019-11-21
Payer: COMMERCIAL

## 2019-11-21 ENCOUNTER — HOSPITAL ENCOUNTER (OUTPATIENT)
Dept: LAB | Age: 34
Discharge: HOME | End: 2019-11-21

## 2019-11-21 ENCOUNTER — OFFICE VISIT (OUTPATIENT)
Dept: INTERNAL MEDICINE CLINIC | Age: 34
End: 2019-11-21

## 2019-11-21 VITALS
OXYGEN SATURATION: 96 % | DIASTOLIC BLOOD PRESSURE: 65 MMHG | TEMPERATURE: 98.6 F | WEIGHT: 135 LBS | HEIGHT: 64 IN | SYSTOLIC BLOOD PRESSURE: 113 MMHG | RESPIRATION RATE: 18 BRPM | BODY MASS INDEX: 23.05 KG/M2 | HEART RATE: 63 BPM

## 2019-11-21 DIAGNOSIS — R10.9 ABDOMINAL PAIN, UNSPECIFIED ABDOMINAL LOCATION: Primary | ICD-10-CM

## 2019-11-21 DIAGNOSIS — R10.9 ABDOMINAL PAIN, UNSPECIFIED ABDOMINAL LOCATION: ICD-10-CM

## 2019-11-21 LAB
ALBUMIN SERPL-MCNC: 4.2 G/DL (ref 3.5–5)
ALBUMIN/GLOB SERPL: 1.4 {RATIO} (ref 1.1–2.2)
ALP SERPL-CCNC: 53 U/L (ref 45–117)
ALT SERPL-CCNC: 17 U/L (ref 12–78)
ANION GAP SERPL CALC-SCNC: 4 MMOL/L (ref 5–15)
AST SERPL-CCNC: 12 U/L (ref 15–37)
BASOPHILS # BLD: 0.1 K/UL (ref 0–0.1)
BASOPHILS NFR BLD: 1 % (ref 0–1)
BILIRUB SERPL-MCNC: 0.7 MG/DL (ref 0.2–1)
BILIRUB UR QL STRIP: NEGATIVE
BUN SERPL-MCNC: 21 MG/DL (ref 6–20)
BUN/CREAT SERPL: 28 (ref 12–20)
CALCIUM SERPL-MCNC: 8.9 MG/DL (ref 8.5–10.1)
CHLORIDE SERPL-SCNC: 108 MMOL/L (ref 97–108)
CO2 SERPL-SCNC: 27 MMOL/L (ref 21–32)
CREAT SERPL-MCNC: 0.75 MG/DL (ref 0.55–1.02)
DIFFERENTIAL METHOD BLD: ABNORMAL
EOSINOPHIL # BLD: 0.2 K/UL (ref 0–0.4)
EOSINOPHIL NFR BLD: 3 % (ref 0–7)
ERYTHROCYTE [DISTWIDTH] IN BLOOD BY AUTOMATED COUNT: 13.4 % (ref 11.5–14.5)
GLOBULIN SER CALC-MCNC: 3.1 G/DL (ref 2–4)
GLUCOSE SERPL-MCNC: 98 MG/DL (ref 65–100)
GLUCOSE UR-MCNC: NEGATIVE MG/DL
HCG URINE, QL. (POC): NEGATIVE
HCT VFR BLD AUTO: 47.8 % (ref 35–47)
HGB BLD-MCNC: 15.3 G/DL (ref 11.5–16)
IMM GRANULOCYTES # BLD AUTO: 0 K/UL (ref 0–0.04)
IMM GRANULOCYTES NFR BLD AUTO: 1 % (ref 0–0.5)
KETONES P FAST UR STRIP-MCNC: NEGATIVE MG/DL
LIPASE SERPL-CCNC: 145 U/L (ref 73–393)
LYMPHOCYTES # BLD: 2.4 K/UL (ref 0.8–3.5)
LYMPHOCYTES NFR BLD: 30 % (ref 12–49)
MCH RBC QN AUTO: 30.5 PG (ref 26–34)
MCHC RBC AUTO-ENTMCNC: 32 G/DL (ref 30–36.5)
MCV RBC AUTO: 95.2 FL (ref 80–99)
MONOCYTES # BLD: 0.7 K/UL (ref 0–1)
MONOCYTES NFR BLD: 9 % (ref 5–13)
NEUTS SEG # BLD: 4.7 K/UL (ref 1.8–8)
NEUTS SEG NFR BLD: 56 % (ref 32–75)
NRBC # BLD: 0 K/UL (ref 0–0.01)
NRBC BLD-RTO: 0 PER 100 WBC
PH UR STRIP: 7 [PH] (ref 4.6–8)
PLATELET # BLD AUTO: 228 K/UL (ref 150–400)
PMV BLD AUTO: 10 FL (ref 8.9–12.9)
POTASSIUM SERPL-SCNC: 4.4 MMOL/L (ref 3.5–5.1)
PROT SERPL-MCNC: 7.3 G/DL (ref 6.4–8.2)
PROT UR QL STRIP: NEGATIVE
RBC # BLD AUTO: 5.02 M/UL (ref 3.8–5.2)
SODIUM SERPL-SCNC: 139 MMOL/L (ref 136–145)
SP GR UR STRIP: 1.02 (ref 1–1.03)
UA UROBILINOGEN AMB POC: NORMAL (ref 0.2–1)
URINALYSIS CLARITY POC: NORMAL
URINALYSIS COLOR POC: YELLOW
URINE BLOOD POC: NORMAL
URINE LEUKOCYTES POC: NORMAL
URINE NITRITES POC: NEGATIVE
VALID INTERNAL CONTROL?: YES
WBC # BLD AUTO: 8.2 K/UL (ref 3.6–11)

## 2019-11-21 PROCEDURE — 74178 CT ABD&PLV WO CNTR FLWD CNTR: CPT

## 2019-11-21 RX ORDER — SODIUM CHLORIDE 0.9 % (FLUSH) 0.9 %
10 SYRINGE (ML) INJECTION
Status: COMPLETED | OUTPATIENT
Start: 2019-11-21 | End: 2019-11-21

## 2019-11-21 RX ADMIN — Medication 10 ML: at 13:03

## 2019-11-21 NOTE — PROGRESS NOTES
Routed to Dr Monahan Subjective   Chief Complaint   Abdominal pain    Meron Palmer is a 37 y.o. female     Presents to clinic for 1-1/2-week history of right-sided abdominal pain. Patient describes the pain as sharp, stabbing, sporadic, and getting more frequent. Pain lasts for a few seconds although now is starting to last closer to 20 to 30 seconds. Sometimes is worse with movement although it still happens even when she is sitting down. doing anything. Has a history of an appendectomy a few years ago. Denies any nausea, vomiting, diarrhea, constipation, dysuria, urgency, frequency. Denies any recent travel. Denies any association with food. Denies a history of kidney stones. Review of Systems   Constitutional: Negative for chills and fever. Respiratory: Negative for shortness of breath. Cardiovascular: Negative for chest pain. Objective   Vitals:       Visit Vitals  /65 (BP 1 Location: Left arm, BP Patient Position: Sitting)   Pulse 63   Temp 98.6 °F (37 °C) (Oral)   Resp 18   Ht 5' 3.7\" (1.618 m)   Wt 135 lb (61.2 kg)   SpO2 96%   BMI 23.39 kg/m²        Physical Exam  Constitutional:       Appearance: Normal appearance. Cardiovascular:      Rate and Rhythm: Normal rate. Pulmonary:      Effort: Pulmonary effort is normal.   Abdominal:      General: Abdomen is flat. Bowel sounds are normal. There is no distension. Palpations: Abdomen is soft. There is no mass. Tenderness: There is no guarding or rebound. Hernia: No hernia is present. Comments: Mild periumbilical and right upper and right lower quadrant pain   Skin:     General: Skin is warm. Neurological:      Mental Status: She is alert. Pregnancy test negative  Assessment and Plan   Diagnoses and all orders for this visit:    1. Abdominal pain, unspecified abdominal location  -     AMB POC URINE PREGNANCY TEST, VISUAL COLOR COMPARISON  -     CBC WITH AUTOMATED DIFF; Future  -     METABOLIC PANEL, COMPREHENSIVE;  Future  - LIPASE; Future  -     CT ABD PELV W WO CONT; Future  -     AMB POC URINALYSIS DIP STICK AUTO W/O MICRO  UA with small blood and positive leuks. Symptoms are not consistent with a UTI although it could be a kidney stone. We will get a CT abdomen and pelvis to look for kidney stones and any other abdominal pathology. Benefits, risks, possible drug interactions, and side effects of all new medications were reviewed with the patient. Pt verbalized understanding. Return to clinic: Pending work-up    Rosemary Varela MD  [facility]  11/21/2019    No future appointments.

## 2019-11-22 ENCOUNTER — TELEPHONE (OUTPATIENT)
Dept: INTERNAL MEDICINE CLINIC | Age: 34
End: 2019-11-22

## 2019-11-22 NOTE — TELEPHONE ENCOUNTER
----- Message from Chad Wade sent at 11/22/2019  4:07 PM EST -----  Regarding: ANA Fishman Memos / Telephone  Reason for call: Pt.  Requesting callback to be provided with Lab test results  Callback required yes/no and why: Yes  Best contact number(s):  [phone]  Details to clarify the request:

## 2020-10-22 ENCOUNTER — VIRTUAL VISIT (OUTPATIENT)
Dept: INTERNAL MEDICINE CLINIC | Age: 35
End: 2020-10-22
Payer: COMMERCIAL

## 2020-10-22 DIAGNOSIS — J30.1 SEASONAL ALLERGIC RHINITIS DUE TO POLLEN: ICD-10-CM

## 2020-10-22 DIAGNOSIS — H66.90 ACUTE OTITIS MEDIA, UNSPECIFIED OTITIS MEDIA TYPE: Primary | ICD-10-CM

## 2020-10-22 PROCEDURE — 99213 OFFICE O/P EST LOW 20 MIN: CPT | Performed by: INTERNAL MEDICINE

## 2020-10-22 RX ORDER — MONTELUKAST SODIUM 10 MG/1
10 TABLET ORAL DAILY
COMMUNITY
End: 2020-10-22 | Stop reason: SDUPTHER

## 2020-10-22 RX ORDER — AMOXICILLIN AND CLAVULANATE POTASSIUM 875; 125 MG/1; MG/1
1 TABLET, FILM COATED ORAL 2 TIMES DAILY
Qty: 10 TAB | Refills: 0 | Status: SHIPPED | OUTPATIENT
Start: 2020-10-22 | End: 2020-10-27

## 2020-10-22 RX ORDER — MONTELUKAST SODIUM 10 MG/1
10 TABLET ORAL DAILY
Qty: 90 TAB | Refills: 3 | Status: SHIPPED | OUTPATIENT
Start: 2020-10-22 | End: 2021-05-27 | Stop reason: SDUPTHER

## 2020-10-22 NOTE — PROGRESS NOTES
Consent: Jaquan Faye, who was seen by synchronous (real-time) audio-video technology, and/or her healthcare decision maker, is aware that this patient-initiated, Telehealth encounter on 10/22/2020 is a billable service, with coverage as determined by her insurance carrier. She is aware that she may receive a bill and has provided verbal consent to proceed: Yes. I was in the office while conducting this encounter. This visit was done with doxy. me    Assessment and Plan   Diagnoses and all orders for this visit:    1. Acute otitis media, unspecified otitis media type  -     amoxicillin-clavulanate (AUGMENTIN) 875-125 mg per tablet; Take 1 Tab by mouth two (2) times a day for 5 days. Reports a 1-1/2-week history of right ear pain that got significantly worse yesterday. Had some cough and sinus congestion prior to her symptoms. Pain is intermittent but frequent and feels like it is radiating to her jaw. Worsens when laying down on it. Associated with frontal sinus pressure. Denies any fever, chills, sinus congestion or drainage, cough. Difficult to assess over the phone but given history, will empirically treat as otitis media    2. Seasonal allergic rhinitis due to pollen  -     montelukast (Singulair) 10 mg tablet; Take 1 Tab by mouth daily. Worsening symptoms in the fall. Requesting refill of medication. Refill provided      We discussed the expected course, resolution and complications of the diagnosis(es) in detail. Medication risks, benefits, costs, interactions, and alternatives were discussed as indicated. I advised her to contact the office if her condition worsens, changes or fails to improve as anticipated. She expressed understanding with the diagnosis(es) and plan.      Return to clinic:   Tomorrow for physical    Torri Buenrostro MD  [facility]  10/22/2020    Future Appointments   Date Time Provider Huseyin Lindsay   10/23/2020  8:00 AM Annetta Gipson MD [facility] BS AMB        Subjective   Chief Complaint   Ear pain    Gabriela Bautista is a 38 y.o. female         Review of Systems   Constitutional: Negative for chills and fever. Respiratory: Negative for shortness of breath. Cardiovascular: Negative for chest pain. Objective   Vitals:       Patient-Reported Vitals 10/22/2020   Patient-Reported Weight 140                 Physical Exam  Constitutional:       Appearance: Normal appearance. She is not ill-appearing. HENT:      Head: Normocephalic and atraumatic. Right Ear: External ear normal.      Left Ear: External ear normal.      Mouth/Throat:      Mouth: Mucous membranes are moist.   Eyes:      General:         Right eye: No discharge. Left eye: No discharge. Extraocular Movements: Extraocular movements intact. Conjunctiva/sclera: Conjunctivae normal.   Neck:      Musculoskeletal: Normal range of motion. Pulmonary:      Effort: Pulmonary effort is normal. No respiratory distress. Musculoskeletal:      Comments: Able to hold phone without issue   Skin:     Comments: No obvious facial rashes or abnormalities   Neurological:      Mental Status: She is alert and oriented to person, place, and time. Comments: No obvious focal deficit   Psychiatric:         Mood and Affect: Mood normal.         Thought Content: Thought content normal.         Judgment: Judgment normal.          Current Outpatient Medications   Medication Sig    montelukast (Singulair) 10 mg tablet Take 1 Tab by mouth daily.  amoxicillin-clavulanate (AUGMENTIN) 875-125 mg per tablet Take 1 Tab by mouth two (2) times a day for 5 days.  clindamycin (CLEOCIN T) 1 % lotion     econazole nitrate (SPECTAZOLE) 1 % topical cream     tretinoin (RETIN-A) 0.025 % topical cream Apply  to affected area nightly.  fluticasone propionate (FLONASE NA) by Nasal route as needed.     levonorgestrel (MIRENA) 20 mcg/24 hr (5 years) IUD 1 Each by IntraUTERine route once. No current facility-administered medications for this visit. Kayla Stacy is a 38 y.o. female being evaluated by a video visit encounter for concerns as above. A caregiver was present when appropriate. Due to this being a TeleHealth encounter (During QDVOI-13 public health emergency), evaluation of the following organ systems was limited: Vitals/Constitutional/EENT/Resp/CV/GI//MS/Neuro/Skin/Heme-Lymph-Imm. Pursuant to the emergency declaration under the [facility], 1135 waiver authority and the [employer] and [employer] Act, this Virtual  Visit was conducted, with patient's (and/or legal guardian's) consent, to reduce the patient's risk of exposure to COVID-19 and provide necessary medical care. Services were provided through a video synchronous discussion virtually to substitute for in-person clinic visit.

## 2020-10-23 ENCOUNTER — OFFICE VISIT (OUTPATIENT)
Dept: INTERNAL MEDICINE CLINIC | Age: 35
End: 2020-10-23
Payer: COMMERCIAL

## 2020-10-23 VITALS
SYSTOLIC BLOOD PRESSURE: 102 MMHG | HEART RATE: 59 BPM | BODY MASS INDEX: 23.9 KG/M2 | OXYGEN SATURATION: 97 % | WEIGHT: 140 LBS | RESPIRATION RATE: 16 BRPM | HEIGHT: 64 IN | DIASTOLIC BLOOD PRESSURE: 69 MMHG | TEMPERATURE: 98.3 F

## 2020-10-23 DIAGNOSIS — Z00.00 WELL ADULT EXAM: Primary | ICD-10-CM

## 2020-10-23 DIAGNOSIS — H69.81 DYSFUNCTION OF RIGHT EUSTACHIAN TUBE: ICD-10-CM

## 2020-10-23 LAB
CHOLEST SERPL-MCNC: 141 MG/DL
GLUCOSE SERPL-MCNC: 92 MG/DL (ref 65–100)
HDLC SERPL-MCNC: 82 MG/DL
HDLC SERPL: 1.7 {RATIO} (ref 0–5)
LDLC SERPL CALC-MCNC: 47.6 MG/DL (ref 0–100)
LIPID PROFILE,FLP: NORMAL
TRIGL SERPL-MCNC: 57 MG/DL (ref ?–150)
VLDLC SERPL CALC-MCNC: 11.4 MG/DL

## 2020-10-23 PROCEDURE — 99395 PREV VISIT EST AGE 18-39: CPT | Performed by: INTERNAL MEDICINE

## 2020-10-23 RX ORDER — DAPSONE 50 MG/G
GEL TOPICAL DAILY
COMMUNITY

## 2020-10-23 NOTE — PROGRESS NOTES
Yennifer Borges is a 38 y.o. female    Chief Complaint   Patient presents with    Complete Physical       Health Maintenance Due   Topic Date Due    PAP AKA CERVICAL CYTOLOGY  02/01/2016       Visit Vitals  /69 (BP 1 Location: Left arm, BP Patient Position: Sitting)   Pulse (!) 59   Temp 98.3 °F (36.8 °C) (Oral)   Resp 16   Ht 5' 3.7\" (1.618 m)   Wt 140 lb (63.5 kg)   SpO2 97%   BMI 24.26 kg/m²       3 most recent PHQ Screens 10/23/2020   Little interest or pleasure in doing things Not at all   Feeling down, depressed, irritable, or hopeless Not at all   Total Score PHQ 2 0       Abuse Screening Questionnaire 10/23/2020   Do you ever feel afraid of your partner? N   Are you in a relationship with someone who physically or mentally threatens you? N   Is it safe for you to go home? Y         1. Have you been to the ER, urgent care clinic since your last visit? Hospitalized since your last visit? No    2. Have you seen or consulted any other health care providers outside of the [address] [place] [address] since your last visit? Include any pap smears or colon screening. YES. Lindy Ochoa with [facility], last visit was less than 1 yr ago.

## 2020-10-23 NOTE — PROGRESS NOTES
Assessment and Plan   Diagnoses and all orders for this visit:    1. Well adult exam  -     LIPID PANEL; Future  -     GLUCOSE, RANDOM; Future  Doing well overall. Discussed continuing healthy habits. Eustachian tube dysfunction  Most likely cause of her right ear pain. No evidence for infection on exam.  However, given sinus pressure, could still take antibiotics to cover for possible sinusitis. Also recommend oral antihistamines and Sudafed for symptom control. Benefits, risks, possible drug interactions, and side effects of all new medications were reviewed with the patient. Pt verbalized understanding. Return to clinic: 1 year for physical or earlier as needed  Needs form filled and faxed once labs are back    Evette Menchaca MD  [facility]  10/23/2020    No future appointments. History of Present Illness   Chief Complaint   Physical    Cece Centeno is a 38 y.o. female         Review of Systems   Constitutional: Negative for chills and fever. HENT: Negative for hearing loss. Eyes: Negative for blurred vision. Respiratory: Negative for shortness of breath. Cardiovascular: Negative for chest pain. Gastrointestinal: Negative for abdominal pain, blood in stool, constipation, diarrhea, melena, nausea and vomiting. Genitourinary: Negative for dysuria and hematuria. Musculoskeletal: Negative for joint pain. Skin: Negative for rash. Neurological: Negative for headaches. Past Medical History   No Known Allergies     Current Outpatient Medications   Medication Sig    Dapsone (Aczone) 5 % gel by Apply Externally route daily.  montelukast (Singulair) 10 mg tablet Take 1 Tab by mouth daily.  tretinoin (RETIN-A) 0.025 % topical cream Apply  to affected area nightly.  fluticasone propionate (FLONASE NA) by Nasal route as needed.  levonorgestrel (MIRENA) 20 mcg/24 hr (5 years) IUD 1 Each by IntraUTERine route once.     amoxicillin-clavulanate (AUGMENTIN) 875-125 mg per tablet Take 1 Tab by mouth two (2) times a day for 5 days. (Patient not taking: Reported on 10/23/2020)    econazole nitrate (SPECTAZOLE) 1 % topical cream      No current facility-administered medications for this visit. Patient Active Problem List   Diagnosis Code    Hives L50.9    Irregular menses N92.6    Chronic headache disorder R51.9, G89.29    Onychomycosis B35.1    Patellofemoral joint pain M25.569    Pregnancy Z34.90    Appendicitis K37    Acute appendicitis K35.80     Past Surgical History:   Procedure Laterality Date    HX APPENDECTOMY  08/2017    HX WISDOM TEETH EXTRACTION        Social History     Tobacco Use    Smoking status: Never Smoker    Smokeless tobacco: Never Used   Substance Use Topics    Alcohol use: Yes     Comment: occasionally      Family History   Problem Relation Age of Onset    Diabetes Mother     Heart Disease Mother 39    Hypertension Mother     High Cholesterol Mother     Stroke Mother     No Known Problems Son     Cancer Paternal Grandmother         lung    Heart Attack Paternal Grandmother     Cancer Paternal Grandfather         liver    Breast Cancer Other         breast, maternal aunt    Hypertension Maternal Grandmother     Heart Attack Maternal Grandmother     Cancer Paternal Aunt         breast cancer    No Known Problems Daughter         Physical Exam   Vitals:       Visit Vitals  /69 (BP 1 Location: Left arm, BP Patient Position: Sitting)   Pulse (!) 59   Temp 98.3 °F (36.8 °C) (Oral)   Resp 16   Ht 5' 3.7\" (1.618 m)   Wt 140 lb (63.5 kg)   SpO2 97%   BMI 24.26 kg/m²        Physical Exam  Constitutional:       General: She is not in acute distress. Appearance: She is well-developed.    HENT:      Right Ear: Ear canal and external ear normal.      Left Ear: Ear canal and external ear normal.      Ears:      Comments: Bilateral TM retracted  Eyes:      Extraocular Movements: Extraocular movements intact. Conjunctiva/sclera: Conjunctivae normal.   Neck:      Musculoskeletal: Neck supple. Cardiovascular:      Rate and Rhythm: Normal rate and regular rhythm. Pulses: Normal pulses. Heart sounds: No murmur. No friction rub. No gallop. Pulmonary:      Effort: No respiratory distress. Breath sounds: No wheezing, rhonchi or rales. Abdominal:      General: Bowel sounds are normal. There is no distension. Palpations: Abdomen is soft. There is no hepatomegaly, splenomegaly or mass. Tenderness: There is no abdominal tenderness. There is no guarding. Skin:     General: Skin is warm. Findings: No rash. Comments: Scattered nevi   Neurological:      Mental Status: She is alert. Psychiatric:         Mood and Affect: Mood normal.         Thought Content:  Thought content normal.         Judgment: Judgment normal.

## 2020-11-02 ENCOUNTER — TELEPHONE (OUTPATIENT)
Dept: INTERNAL MEDICINE CLINIC | Age: 35
End: 2020-11-02

## 2020-11-02 NOTE — TELEPHONE ENCOUNTER
I spoke with pt, she states [facility] sent her documentation stating the lab req. Didn't have the date her labs were drawn. I advise that her results have been resulted and it looks like they were ran by [facility]. Pt has the form where the spot that says \"date\" for labs is blank. She will send us the form via [email or web address] for our office to complete and fax back.

## 2020-11-02 NOTE — TELEPHONE ENCOUNTER
----- Message from [place] sent at 11/2/2020 10:16 AM EST -----  Regarding: Dr. Sanam Salazar Message/Vendor Calls    Caller's first and last name: Vannie Goltz      Reason for call: Pt had tests done at office and results were faxed to [address] [place] [address].  Report did not include date of tests.   Requesting a call back to see if date could be added to test results and refaxed to [address] [place] [address] required yes/no and why: yes      Best contact number(s):[phone]      Details to clarify the request: [id number]

## 2021-03-04 ENCOUNTER — TELEPHONE (OUTPATIENT)
Dept: INTERNAL MEDICINE CLINIC | Age: 36
End: 2021-03-04

## 2021-03-04 NOTE — TELEPHONE ENCOUNTER
----- Message from Perez Albarran sent at 3/4/2021  7:44 AM EST -----  Regarding: Dr bhagat/telephone  Contact: [phone]  Appointment not available    Caller's first and last name and relationship to patient (if not the patient):      Best contact number:[phone]      Preferred date and time:      Scheduled appointment date and time:      Reason for appointment:sprained toe on right foot      Details to clarify the request:      Perez Albarran

## 2021-03-05 ENCOUNTER — OFFICE VISIT (OUTPATIENT)
Dept: INTERNAL MEDICINE CLINIC | Age: 36
End: 2021-03-05
Payer: COMMERCIAL

## 2021-03-05 VITALS
BODY MASS INDEX: 25.69 KG/M2 | TEMPERATURE: 98.1 F | OXYGEN SATURATION: 98 % | DIASTOLIC BLOOD PRESSURE: 61 MMHG | RESPIRATION RATE: 16 BRPM | SYSTOLIC BLOOD PRESSURE: 98 MMHG | HEIGHT: 63 IN | HEART RATE: 73 BPM | WEIGHT: 145 LBS

## 2021-03-05 DIAGNOSIS — M79.674 PAIN OF TOE OF RIGHT FOOT: Primary | ICD-10-CM

## 2021-03-05 PROCEDURE — 99212 OFFICE O/P EST SF 10 MIN: CPT | Performed by: INTERNAL MEDICINE

## 2021-03-05 NOTE — PROGRESS NOTES
Assessment and Plan   Diagnoses and all orders for this visit:    1. Pain of toe of right foot  Was walking in the dark when she stubbed her right toe on the bed pretty hard 2 nights ago. Has had persistent pain, swelling since then. Has also noticed that the pain is now radiating up her foot and her leg. Swelling and tenderness noted on exam.  Recommend anti-inflammatory medications, icing for a week. Defer imaging for now. Advised to let us know if symptoms do not improve by next week. Consider getting imaging if no improvement. Benefits, risks, possible drug interactions, and side effects of all new medications were reviewed with the patient. Pt verbalized understanding. Return to clinic: As needed    An electronic signature was used to authenticate this note. Manuel Alba MD  [facility]  3/5/2021    No future appointments. Subjective   Chief Complaint   Toe pain    Yoan Baker is a 43 y.o. female           Objective   Vitals:       Visit Vitals  BP 98/61   Pulse 73   Temp 98.1 °F (36.7 °C) (Oral)   Resp 16   Ht 5' 3\" (1.6 m)   Wt 145 lb (65.8 kg)   SpO2 98%   BMI 25.69 kg/m²        Physical Exam  Constitutional:       Appearance: Normal appearance. She is not ill-appearing. Cardiovascular:      Rate and Rhythm: Normal rate. Pulmonary:      Effort: No respiratory distress. Musculoskeletal:      Comments: Right second toe with mild swelling and tenderness. Also mildly tender on plantar and dorsal foot anteriorly. Able to move toe okay. Neurological:      Mental Status: She is alert. Current Outpatient Medications   Medication Sig    Dapsone (Aczone) 5 % gel by Apply Externally route daily.  montelukast (Singulair) 10 mg tablet Take 1 Tab by mouth daily.  tretinoin (RETIN-A) 0.025 % topical cream Apply  to affected area nightly.  fluticasone propionate (FLONASE NA) by Nasal route as needed.     levonorgestrel (MIRENA) 20 mcg/24 hr (5 years) IUD 1 Each by IntraUTERine route once.  econazole nitrate (SPECTAZOLE) 1 % topical cream      No current facility-administered medications for this visit.

## 2021-05-27 DIAGNOSIS — J30.1 SEASONAL ALLERGIC RHINITIS DUE TO POLLEN: ICD-10-CM

## 2021-05-28 RX ORDER — MONTELUKAST SODIUM 10 MG/1
10 TABLET ORAL DAILY
Qty: 90 TABLET | Refills: 3 | Status: SHIPPED | OUTPATIENT
Start: 2021-05-28 | End: 2022-03-21

## 2022-03-19 PROBLEM — K37 APPENDICITIS: Status: ACTIVE | Noted: 2017-08-21

## 2022-03-19 PROBLEM — K35.80 ACUTE APPENDICITIS: Status: ACTIVE | Noted: 2017-08-21

## 2022-03-21 DIAGNOSIS — J30.1 SEASONAL ALLERGIC RHINITIS DUE TO POLLEN: ICD-10-CM

## 2022-03-21 RX ORDER — MONTELUKAST SODIUM 10 MG/1
10 TABLET ORAL DAILY
Qty: 90 TABLET | Refills: 3 | Status: SHIPPED | OUTPATIENT
Start: 2022-03-21

## 2022-12-15 ENCOUNTER — TELEPHONE (OUTPATIENT)
Dept: INTERNAL MEDICINE CLINIC | Age: 37
End: 2022-12-15

## 2022-12-15 ENCOUNTER — OFFICE VISIT (OUTPATIENT)
Dept: INTERNAL MEDICINE CLINIC | Age: 37
End: 2022-12-15
Payer: COMMERCIAL

## 2022-12-15 VITALS
DIASTOLIC BLOOD PRESSURE: 68 MMHG | SYSTOLIC BLOOD PRESSURE: 113 MMHG | OXYGEN SATURATION: 96 % | HEIGHT: 63 IN | RESPIRATION RATE: 16 BRPM | BODY MASS INDEX: 25.69 KG/M2 | WEIGHT: 145 LBS | TEMPERATURE: 98.1 F | HEART RATE: 65 BPM

## 2022-12-15 DIAGNOSIS — G43.809 OTHER MIGRAINE WITHOUT STATUS MIGRAINOSUS, NOT INTRACTABLE: Primary | ICD-10-CM

## 2022-12-15 DIAGNOSIS — G44.229 CHRONIC TENSION-TYPE HEADACHE, NOT INTRACTABLE: ICD-10-CM

## 2022-12-15 PROBLEM — K35.80 ACUTE APPENDICITIS: Status: RESOLVED | Noted: 2017-08-21 | Resolved: 2022-12-15

## 2022-12-15 PROBLEM — K37 APPENDICITIS: Status: RESOLVED | Noted: 2017-08-21 | Resolved: 2022-12-15

## 2022-12-15 PROCEDURE — 99213 OFFICE O/P EST LOW 20 MIN: CPT | Performed by: INTERNAL MEDICINE

## 2022-12-15 RX ORDER — METHYLPREDNISOLONE 4 MG/1
TABLET ORAL
Qty: 1 DOSE PACK | Refills: 0 | Status: SHIPPED | OUTPATIENT
Start: 2022-12-15 | End: 2022-12-25

## 2022-12-15 RX ORDER — SUMATRIPTAN 50 MG/1
50 TABLET, FILM COATED ORAL
Qty: 12 TABLET | Refills: 2 | Status: SHIPPED | OUTPATIENT
Start: 2022-12-15

## 2022-12-15 NOTE — PROGRESS NOTES
HISTORY OF PRESENT ILLNESS    Chief Complaint   Patient presents with    Headache     Different than normal headaches. Pressure Behind the Eyes     And right side of face - tender to touch. Seen by me in October 2014, but has seen several other doctors within the practice for acute issues since that time. Presents with headaches. Onset was about 2 weeks ago. She is having intermittent headaches which have been moderately severe. Headaches are usually on the right side, usually behind her right eye but has occasionally had some symptoms on the left side as well. Symptoms also are behind her right cheek and she is concerned about sinus pressure and possibility of infection. She states that when she has headaches, her vision is more sensitive to light and she is also more sensitive to sound. Denies any nausea and vomiting. She has been taking ibuprofen and Sudafed which did help a little bit. Denies any congestion, runny nose, cough or any sick contacts. She does have a history of migraine and was given Imitrex 50 mg in the past which did help in the past.  She does have some of that remaining but has not taken it. She does drink several cups of coffee per day but denies any changes recently. Review of Systems   All other systems reviewed and are negative, except as noted in HPI    Past Medical and Surgical History   has a past medical history of Chronic headache disorder, Hives (9/2014), Irregular menses, Onychomycosis, and Patellofemoral joint pain.     She has no past medical history of Abnormal Pap smear, Abnormal Papanicolaou smear of cervix, Anemia, Asthma, Chlamydia, Complication of anesthesia, Diabetes ([address] Essential hypertension, Genital herpes, unspecified, Gonorrhea, Heart abnormality, Herpes gestationis, Herpes simplex without mention of complication, Human immunodeficiency virus (HIV) disease ([address] OTHER MEDICAL, Infertility, female, Kidney disease, Liver disease, Phlebitis and thrombophlebitis of unspecified site, Pituitary disorder ([facility] [address] Polycystic disease, ovaries, Postpartum depression, Psychiatric problem, Rhesus isoimmunization unspecified as to episode of care in pregnancy, Sickle-cell disease, unspecified, Syphilis, Systemic lupus erythematosus ([facility] [address] Thyroid activity decreased, Trauma, Unspecified breast disorder, Unspecified diseases of blood and blood-forming organs, or Unspecified epilepsy without mention of intractable epilepsy. has a past surgical history that includes hx wisdom teeth extraction and hx appendectomy (08/2017). reports that she has never smoked. She has never used smokeless tobacco. She reports current alcohol use. She reports that she does not use drugs. family history includes Breast Cancer in an other family member; Cancer in her paternal aunt, paternal grandfather, and paternal grandmother; Diabetes in her mother; Heart Attack in her maternal grandmother and paternal grandmother; Heart Disease (age of onset: 39) in her mother; High Cholesterol in her mother; Hypertension in her maternal grandmother and mother; No Known Problems in her daughter and son; Stroke in her mother. Physical Exam   Nursing note and vitals reviewed. Blood pressure 113/68, pulse 65, temperature 98.1 °F (36.7 °C), temperature source Oral, resp. rate 16, height 5' 3\" (1.6 m), weight 145 lb (65.8 kg), SpO2 96 %, currently breastfeeding. Constitutional: In no distress. Eyes: Conjunctivae are normal.  HEENT:  No LAD or thyromegaly   Bilateral nasal mucosa with no significant abnormalities. Cardiovascular: Normal rate. regular rhythm. No murmurs  No edema  Pulmonary/Chest: Effort normal. clear to ausculation blaterally  Musculoskeletal:  no edema. Abd:  Neurological: Alert and oriented. Grossly intact cranial nerves and motor function. Pupils equal, round reactive to light and accommodation. Skin: No visible rash noted. Psychiatric: Normal mood and affect. Behavior is normal.     Diagnoses and all orders for this visit:    1. Other migraine without status migrainosus, not intractable  2. Chronic tension-type headache, not intractable  Headaches are intermittent, largely unilateral on the right side, behind eye and cheek but sometimes on the left side. I do think these headaches are most likely migrainous. Recommend trial of Imitrex as needed. Since she has had several recurrent headaches, will give a 6-day Medrol dose pack as well. Normal neurologic exam.  I will think any imaging is currently warranted. We will continue to monitor.  -     SUMAtriptan (IMITREX) 50 mg tablet; Take 1 Tablet by mouth daily as needed for Migraine. May repeat in 2 hours with 2nd tablet; max 2 tabs in 24 hours  -     methylPREDNISolone (MEDROL DOSEPACK) 4 mg tablet; USE AS DIRECTED ON PACKAGE    lab results and schedule of future lab studies reviewed with patient  reviewed medications and side effects in detail    Return to clinic for further evaluation if new symptoms develop or if current symptoms worsen or fail to resolve.

## 2022-12-15 NOTE — TELEPHONE ENCOUNTER
Spoke with patient and she reports debilitating headaches last couple of weeks on and off. She reports that it is behind her eye forehead and cheek. Migrates back and forth from side to side. Sinus pressure. Pain 4-9/10. Denies changes in vision but more sensitive to light and sound. Denies any nausea. She reports that whatever side the pain is on the ear feels full. She reports taking Ibuprofen and sudafed. She reports that it did help. She is vaccinated and boosted x1 for COVID, Flu vaccine. Denies any runny nose cough or congestion, afebrile. Scheduled for a ame day appt at 10:20 AM.  Grateful for the call.

## 2023-05-01 DIAGNOSIS — J30.1 SEASONAL ALLERGIC RHINITIS DUE TO POLLEN: ICD-10-CM

## 2023-05-01 RX ORDER — MONTELUKAST SODIUM 10 MG/1
10 TABLET ORAL DAILY
Qty: 90 TABLET | Refills: 3 | Status: SHIPPED | OUTPATIENT
Start: 2023-05-01

## 2023-05-14 DIAGNOSIS — G44.229 CHRONIC TENSION-TYPE HEADACHE, NOT INTRACTABLE: ICD-10-CM

## 2023-05-15 RX ORDER — SUMATRIPTAN 50 MG/1
TABLET, FILM COATED ORAL
Qty: 10 TABLET | Refills: 3 | Status: SHIPPED | OUTPATIENT
Start: 2023-05-15

## 2023-05-24 RX ORDER — SUMATRIPTAN 50 MG/1
50 TABLET, FILM COATED ORAL DAILY PRN
COMMUNITY
Start: 2022-12-15

## 2023-05-24 RX ORDER — MONTELUKAST SODIUM 10 MG/1
10 TABLET ORAL DAILY
COMMUNITY
Start: 2023-05-01

## 2023-10-10 DIAGNOSIS — G44.229 CHRONIC TENSION-TYPE HEADACHE, NOT INTRACTABLE: ICD-10-CM

## 2023-10-10 RX ORDER — SUMATRIPTAN 50 MG/1
TABLET, FILM COATED ORAL
Qty: 10 TABLET | Refills: 3 | Status: SHIPPED | OUTPATIENT
Start: 2023-10-10

## 2023-10-10 NOTE — TELEPHONE ENCOUNTER
Chief Complaint   Patient presents with    Medication Refill       Requested Prescriptions     Pending Prescriptions Disp Refills    SUMAtriptan (IMITREX) 50 MG tablet 10 tablet 3     Sig: TAKE 1 TABLET AS NEEDED FOR MIGRAINE. MAY REPEAT 1 TABLET IN 2 HOURS, MAX 2 TABLETS IN 24 HOURS       Allergies:  Not on File    Last visit with clinic:  12/15/2022   Next visit with clinic: Visit date not found     Last visit with this provider: 12/15/2022   Next Visit with this provider: Visit date not found    Signed by Gigi CATHERINE  10/10/23  5:06 PM

## 2024-02-15 RX ORDER — MONTELUKAST SODIUM 10 MG/1
10 TABLET ORAL DAILY
Qty: 90 TABLET | Refills: 0 | Status: SHIPPED | OUTPATIENT
Start: 2024-02-15

## 2024-05-17 RX ORDER — MONTELUKAST SODIUM 10 MG/1
10 TABLET ORAL DAILY
Qty: 90 TABLET | Refills: 3 | Status: SHIPPED | OUTPATIENT
Start: 2024-05-17

## 2024-05-22 ENCOUNTER — OFFICE VISIT (OUTPATIENT)
Age: 39
End: 2024-05-22
Payer: COMMERCIAL

## 2024-05-22 VITALS
OXYGEN SATURATION: 99 % | SYSTOLIC BLOOD PRESSURE: 114 MMHG | TEMPERATURE: 98.1 F | HEIGHT: 63 IN | BODY MASS INDEX: 26.21 KG/M2 | DIASTOLIC BLOOD PRESSURE: 74 MMHG | RESPIRATION RATE: 16 BRPM | HEART RATE: 53 BPM | WEIGHT: 147.9 LBS

## 2024-05-22 DIAGNOSIS — Z11.59 NEED FOR HEPATITIS C SCREENING TEST: ICD-10-CM

## 2024-05-22 DIAGNOSIS — Z91.09 ENVIRONMENTAL ALLERGIES: ICD-10-CM

## 2024-05-22 DIAGNOSIS — G43.829 MENSTRUAL MIGRAINE WITHOUT STATUS MIGRAINOSUS, NOT INTRACTABLE: ICD-10-CM

## 2024-05-22 DIAGNOSIS — Z13.1 SCREENING FOR DIABETES MELLITUS: ICD-10-CM

## 2024-05-22 DIAGNOSIS — Z00.00 ANNUAL PHYSICAL EXAM: Primary | ICD-10-CM

## 2024-05-22 DIAGNOSIS — Z13.6 SCREENING FOR CARDIOVASCULAR CONDITION: ICD-10-CM

## 2024-05-22 PROCEDURE — 99396 PREV VISIT EST AGE 40-64: CPT | Performed by: NURSE PRACTITIONER

## 2024-05-22 RX ORDER — FLUTICASONE PROPIONATE 50 MCG
1 SPRAY, SUSPENSION (ML) NASAL DAILY
COMMUNITY
Start: 2020-03-01

## 2024-05-22 SDOH — ECONOMIC STABILITY: FOOD INSECURITY: WITHIN THE PAST 12 MONTHS, YOU WORRIED THAT YOUR FOOD WOULD RUN OUT BEFORE YOU GOT MONEY TO BUY MORE.: NEVER TRUE

## 2024-05-22 SDOH — ECONOMIC STABILITY: FOOD INSECURITY: WITHIN THE PAST 12 MONTHS, THE FOOD YOU BOUGHT JUST DIDN'T LAST AND YOU DIDN'T HAVE MONEY TO GET MORE.: NEVER TRUE

## 2024-05-22 SDOH — ECONOMIC STABILITY: HOUSING INSECURITY
IN THE LAST 12 MONTHS, WAS THERE A TIME WHEN YOU DID NOT HAVE A STEADY PLACE TO SLEEP OR SLEPT IN A SHELTER (INCLUDING NOW)?: NO

## 2024-05-22 SDOH — ECONOMIC STABILITY: INCOME INSECURITY: HOW HARD IS IT FOR YOU TO PAY FOR THE VERY BASICS LIKE FOOD, HOUSING, MEDICAL CARE, AND HEATING?: NOT HARD AT ALL

## 2024-05-22 ASSESSMENT — PATIENT HEALTH QUESTIONNAIRE - PHQ9
SUM OF ALL RESPONSES TO PHQ QUESTIONS 1-9: 0
SUM OF ALL RESPONSES TO PHQ9 QUESTIONS 1 & 2: 0
2. FEELING DOWN, DEPRESSED OR HOPELESS: NOT AT ALL
SUM OF ALL RESPONSES TO PHQ QUESTIONS 1-9: 0
1. LITTLE INTEREST OR PLEASURE IN DOING THINGS: NOT AT ALL

## 2024-05-22 NOTE — PROGRESS NOTES
\"Have you been to the ER, urgent care clinic since your last visit?  Hospitalized since your last visit?\"    NO    “Have you seen or consulted any other health care providers outside of [facility] since your last visit?”    NO     “Have you had a pap smear?”    YES - Where: [facility] . Dr. Irene Joyce    Date: 2021            Click Here for Release of Records Request   
    Family History   Problem Relation Age of Onset    Hypertension Mother     Heart Disease Mother 45    Diabetes Mother     High Cholesterol Mother     Stroke Mother     Arthritis Mother     Arrhythmia Mother     Hearing Loss Mother     Heart Attack Mother     High Blood Pressure Mother     Obesity Mother     No Known Problems Son     Cancer Paternal Grandmother         lung    Heart Attack Paternal Grandmother     No Known Problems Daughter     Cancer Paternal Aunt         breast cancer    Breast Cancer Paternal Aunt     Heart Attack Maternal Grandmother     Hypertension Maternal Grandmother     Cancer Paternal Grandfather         liver    Alcohol Abuse Paternal Grandfather     Breast Cancer Other         breast, maternal aunt    Alcohol Abuse Father     Stroke Father     Arrhythmia Maternal Aunt     Atrial Fibrillation Maternal Aunt     Coronary Art Dis Maternal Aunt     Colon Cancer Maternal Aunt     Diabetes Maternal Aunt     Gout Maternal Aunt     Heart Attack Maternal Aunt     Heart Disease Maternal Aunt     High Blood Pressure Maternal Aunt     Obesity Maternal Aunt     Heart Attack Maternal Uncle     Immune Disorder Paternal Cousin     Kidney Disease Maternal Aunt     Obesity Maternal Aunt        Review of Systems - History obtained from the patient  General ROS: negative for - night sweats, weight gain or weight loss  Cardiovascular ROS: no chest pain, dyspnea on exertion, edema  GYN ROS: no breast pain or new or enlarging lumps on self exam, no discharge or pelvic pain.    Physical exam  Blood pressure 114/74, pulse 53, temperature 98.1 °F (36.7 °C), temperature source Oral, resp. rate 16, height 1.6 m (5' 3\"), weight 67.1 kg (147 lb 14.4 oz), last menstrual period 05/20/2024, SpO2 99 %.  Wt Readings from Last 3 Encounters:   05/22/24 67.1 kg (147 lb 14.4 oz)   12/15/22 65.8 kg (145 lb)   03/05/21 65.8 kg (145 lb)     she appears well, alert and oriented x 3, pleasant and cooperative. Vitals as noted.

## 2024-05-23 LAB
ALBUMIN SERPL-MCNC: 4 G/DL (ref 3.5–5)
ALBUMIN/GLOB SERPL: 1.3 (ref 1.1–2.2)
ALP SERPL-CCNC: 52 U/L (ref 45–117)
ALT SERPL-CCNC: 20 U/L (ref 12–78)
ANION GAP SERPL CALC-SCNC: 4 MMOL/L (ref 5–15)
AST SERPL-CCNC: 14 U/L (ref 15–37)
BASOPHILS # BLD: 0.1 K/UL (ref 0–0.1)
BASOPHILS NFR BLD: 1 % (ref 0–1)
BILIRUB SERPL-MCNC: 0.7 MG/DL (ref 0.2–1)
BUN SERPL-MCNC: 14 MG/DL (ref 6–20)
BUN/CREAT SERPL: 17 (ref 12–20)
CALCIUM SERPL-MCNC: 9 MG/DL (ref 8.5–10.1)
CHLORIDE SERPL-SCNC: 109 MMOL/L (ref 97–108)
CHOLEST SERPL-MCNC: 124 MG/DL
CO2 SERPL-SCNC: 27 MMOL/L (ref 21–32)
CREAT SERPL-MCNC: 0.82 MG/DL (ref 0.55–1.02)
DIFFERENTIAL METHOD BLD: NORMAL
EOSINOPHIL # BLD: 0.3 K/UL (ref 0–0.4)
EOSINOPHIL NFR BLD: 4 % (ref 0–7)
ERYTHROCYTE [DISTWIDTH] IN BLOOD BY AUTOMATED COUNT: 12.5 % (ref 11.5–14.5)
EST. AVERAGE GLUCOSE BLD GHB EST-MCNC: 97 MG/DL
GLOBULIN SER CALC-MCNC: 3 G/DL (ref 2–4)
GLUCOSE SERPL-MCNC: 93 MG/DL (ref 65–100)
HBA1C MFR BLD: 5 % (ref 4–5.6)
HCT VFR BLD AUTO: 43.7 % (ref 35–47)
HDLC SERPL-MCNC: 63 MG/DL
HDLC SERPL: 2 (ref 0–5)
HGB BLD-MCNC: 15 G/DL (ref 11.5–16)
IMM GRANULOCYTES # BLD AUTO: 0 K/UL (ref 0–0.04)
IMM GRANULOCYTES NFR BLD AUTO: 0 % (ref 0–0.5)
LDLC SERPL CALC-MCNC: 45.8 MG/DL (ref 0–100)
LYMPHOCYTES # BLD: 1.7 K/UL (ref 0.8–3.5)
LYMPHOCYTES NFR BLD: 26 % (ref 12–49)
MCH RBC QN AUTO: 30.4 PG (ref 26–34)
MCHC RBC AUTO-ENTMCNC: 34.3 G/DL (ref 30–36.5)
MCV RBC AUTO: 88.6 FL (ref 80–99)
MONOCYTES # BLD: 0.5 K/UL (ref 0–1)
MONOCYTES NFR BLD: 8 % (ref 5–13)
NEUTS SEG # BLD: 4.1 K/UL (ref 1.8–8)
NEUTS SEG NFR BLD: 61 % (ref 32–75)
NRBC # BLD: 0 K/UL (ref 0–0.01)
NRBC BLD-RTO: 0 PER 100 WBC
PLATELET # BLD AUTO: 211 K/UL (ref 150–400)
PMV BLD AUTO: 10.5 FL (ref 8.9–12.9)
POTASSIUM SERPL-SCNC: 4.2 MMOL/L (ref 3.5–5.1)
PROT SERPL-MCNC: 7 G/DL (ref 6.4–8.2)
RBC # BLD AUTO: 4.93 M/UL (ref 3.8–5.2)
SODIUM SERPL-SCNC: 140 MMOL/L (ref 136–145)
TRIGL SERPL-MCNC: 76 MG/DL
TSH SERPL DL<=0.05 MIU/L-ACNC: 0.64 UIU/ML (ref 0.36–3.74)
VLDLC SERPL CALC-MCNC: 15.2 MG/DL
WBC # BLD AUTO: 6.7 K/UL (ref 3.6–11)

## 2024-05-24 LAB
HCV AB SERPL QL IA: NORMAL
HCV IGG SERPL QL IA: NON REACTIVE S/CO RATIO

## 2024-07-02 RX ORDER — PREDNISONE 50 MG/1
TABLET ORAL
Qty: 5 TABLET | Refills: 0 | Status: SHIPPED | OUTPATIENT
Start: 2024-07-02

## 2024-10-01 ENCOUNTER — OFFICE VISIT (OUTPATIENT)
Age: 39
End: 2024-10-01
Payer: COMMERCIAL

## 2024-10-01 VITALS
BODY MASS INDEX: 26.9 KG/M2 | RESPIRATION RATE: 16 BRPM | HEIGHT: 63 IN | TEMPERATURE: 98.4 F | HEART RATE: 52 BPM | SYSTOLIC BLOOD PRESSURE: 98 MMHG | OXYGEN SATURATION: 98 % | WEIGHT: 151.8 LBS | DIASTOLIC BLOOD PRESSURE: 64 MMHG

## 2024-10-01 DIAGNOSIS — R82.90 ABNORMAL FINDING ON URINALYSIS: ICD-10-CM

## 2024-10-01 DIAGNOSIS — M54.32 BILATERAL SCIATICA: Primary | ICD-10-CM

## 2024-10-01 DIAGNOSIS — M54.31 BILATERAL SCIATICA: Primary | ICD-10-CM

## 2024-10-01 LAB
BILIRUBIN, URINE, POC: NEGATIVE
BLOOD URINE, POC: NEGATIVE
GLUCOSE URINE, POC: NEGATIVE
KETONES, URINE, POC: NEGATIVE
LEUKOCYTE ESTERASE, URINE, POC: NORMAL
NITRITE, URINE, POC: NEGATIVE
PH, URINE, POC: 7 (ref 4.6–8)
PROTEIN,URINE, POC: NEGATIVE
SPECIFIC GRAVITY, URINE, POC: 1.02 (ref 1–1.03)
URINALYSIS CLARITY, POC: CLEAR
URINALYSIS COLOR, POC: YELLOW
UROBILINOGEN, POC: NORMAL MG/DL

## 2024-10-01 PROCEDURE — 1036F TOBACCO NON-USER: CPT | Performed by: NURSE PRACTITIONER

## 2024-10-01 PROCEDURE — G8484 FLU IMMUNIZE NO ADMIN: HCPCS | Performed by: NURSE PRACTITIONER

## 2024-10-01 PROCEDURE — G8427 DOCREV CUR MEDS BY ELIG CLIN: HCPCS | Performed by: NURSE PRACTITIONER

## 2024-10-01 PROCEDURE — 81002 URINALYSIS NONAUTO W/O SCOPE: CPT | Performed by: NURSE PRACTITIONER

## 2024-10-01 PROCEDURE — 99213 OFFICE O/P EST LOW 20 MIN: CPT | Performed by: NURSE PRACTITIONER

## 2024-10-01 PROCEDURE — G8419 CALC BMI OUT NRM PARAM NOF/U: HCPCS | Performed by: NURSE PRACTITIONER

## 2024-10-01 RX ORDER — PREDNISONE 20 MG/1
TABLET ORAL
Qty: 18 TABLET | Refills: 0 | Status: SHIPPED | OUTPATIENT
Start: 2024-10-01

## 2024-10-01 RX ORDER — CYCLOBENZAPRINE HCL 10 MG
10 TABLET ORAL NIGHTLY PRN
Qty: 14 TABLET | Refills: 0 | Status: SHIPPED | OUTPATIENT
Start: 2024-10-01 | End: 2024-10-15

## 2024-10-01 ASSESSMENT — ENCOUNTER SYMPTOMS
CHEST TIGHTNESS: 0
NAUSEA: 0
DIARRHEA: 0
GASTROINTESTINAL NEGATIVE: 1
VOMITING: 0
BACK PAIN: 1
RESPIRATORY NEGATIVE: 1
SINUS PAIN: 0
EYES NEGATIVE: 1
BLOOD IN STOOL: 0
RHINORRHEA: 0
CONSTIPATION: 0
COUGH: 0
EYE REDNESS: 0
ABDOMINAL PAIN: 0
SHORTNESS OF BREATH: 0
SINUS PRESSURE: 0
EYE PAIN: 0

## 2024-10-01 NOTE — PROGRESS NOTES
Assessment and Plan     1. Bilateral sciatica: Will start treatment with Prednisone and Flexeril, mode of use and possible side effects discussed. Recommended to avoid NSAIDs while taking Prednisone, can take Tylenol up to 3 grams daily. Recommended physical therapy. Continue with stretching exercises, ice/cold compresses. Return instructions given. Pt verbalized understanding.   -     AMB POC URINALYSIS DIP STICK MANUAL W/O MICRO  -     predniSONE (DELTASONE) 20 MG tablet; Take 3 pills (60 mg) daily for 3 days, then 2 pills (40 mg) daily for 3 days, then 1 pill (20 mg) daily for 3 days, Disp-18 tablet, R-0Normal  -     cyclobenzaprine (FLEXERIL) 10 MG tablet; Take 1 tablet by mouth nightly as needed for Muscle spasms, Disp-14 tablet, R-0Normal  2. Abnormal finding on urinalysis: Urinalysis positive for leukocytes, urine sent for culture. Will rule out UTI, hydration recommended.   -     Culture, Urine; Future  -     AMB POC URINALYSIS DIP STICK MANUAL W/O MICRO       Benefits, risks, possible drug interactions, and side effects of all new medications were reviewed with the patient. Pt verbalized understanding.    An electronic signature was used to authenticate this note.  Kaylin Santillan, APRN - CNP  10/1/2024      Follow-up and Dispositions    Return if symptoms worsen or fail to improve.          History of Present Illness   Chief Complaint     Luz Barron is a 39 y.o. female here for had concerns including Lower Back Pain.   Mrs. Barron presents today with reports of mid lower back pain radiating to bilateral leg, onset 2 weeks ago. Symptoms started after she picked up her 45 lb dogs. Has tried stretching exercises, OTC gels, Ibuprofen as needed and cold/hoy compresses with no improvement. Denies urinary changes, BM changes.     Review of Systems  Review of Systems   Constitutional: Negative.  Negative for chills, fatigue, fever and unexpected weight change.   HENT: Negative.  Negative for congestion,

## 2024-10-02 LAB
BACTERIA SPEC CULT: NORMAL
SERVICE CMNT-IMP: NORMAL

## 2025-01-09 DIAGNOSIS — M54.32 BILATERAL SCIATICA: Primary | ICD-10-CM

## 2025-01-09 DIAGNOSIS — M54.31 BILATERAL SCIATICA: Primary | ICD-10-CM

## 2025-01-17 ENCOUNTER — HOSPITAL ENCOUNTER (OUTPATIENT)
Facility: HOSPITAL | Age: 40
Discharge: HOME | End: 2025-01-20
Payer: COMMERCIAL

## 2025-01-17 DIAGNOSIS — M54.31 BILATERAL SCIATICA: ICD-10-CM

## 2025-01-17 DIAGNOSIS — M54.32 BILATERAL SCIATICA: ICD-10-CM

## 2025-01-17 PROCEDURE — 72100 X-RAY EXAM L-S SPINE 2/3 VWS: CPT

## 2025-02-20 DIAGNOSIS — M54.41 MIDLINE LOW BACK PAIN WITH BILATERAL SCIATICA, UNSPECIFIED CHRONICITY: Primary | ICD-10-CM

## 2025-02-20 DIAGNOSIS — M54.42 MIDLINE LOW BACK PAIN WITH BILATERAL SCIATICA, UNSPECIFIED CHRONICITY: Primary | ICD-10-CM

## 2025-02-26 ENCOUNTER — HOSPITAL ENCOUNTER (OUTPATIENT)
Facility: HOSPITAL | Age: 40
Discharge: HOME | End: 2025-03-01
Payer: COMMERCIAL

## 2025-02-26 DIAGNOSIS — M54.42 MIDLINE LOW BACK PAIN WITH BILATERAL SCIATICA, UNSPECIFIED CHRONICITY: ICD-10-CM

## 2025-02-26 DIAGNOSIS — M54.41 MIDLINE LOW BACK PAIN WITH BILATERAL SCIATICA, UNSPECIFIED CHRONICITY: ICD-10-CM

## 2025-02-26 PROCEDURE — 72148 MRI LUMBAR SPINE W/O DYE: CPT

## 2025-06-04 DIAGNOSIS — Z91.09 ENVIRONMENTAL ALLERGIES: Primary | ICD-10-CM

## 2025-06-05 RX ORDER — MONTELUKAST SODIUM 10 MG/1
10 TABLET ORAL DAILY
Qty: 90 TABLET | Refills: 3 | Status: SHIPPED | OUTPATIENT
Start: 2025-06-05

## (undated) DEVICE — TUBING INSUFLTN 10FT LUER -- CONVERT TO ITEM 368568

## (undated) DEVICE — INFECTION CONTROL KIT SYS

## (undated) DEVICE — DEVICE TRNSF SPIK STL 2008S] MICROTEK MEDICAL INC]

## (undated) DEVICE — BLUNT TIP TROCAR: Brand: AUTO SUTURE

## (undated) DEVICE — STERILE POLYISOPRENE POWDER-FREE SURGICAL GLOVES: Brand: PROTEXIS

## (undated) DEVICE — ARTICULATING RELOAD WITH TRI-STAPLE TECHNOLOGY: Brand: ENDO GIA

## (undated) DEVICE — (D)PREP SKN CHLRAPRP APPL 26ML -- CONVERT TO ITEM 371833

## (undated) DEVICE — DEVON™ KNEE AND BODY STRAP 60" X 3" (1.5 M X 7.6 CM): Brand: DEVON

## (undated) DEVICE — DRAPE,REIN 53X77,STERILE: Brand: MEDLINE

## (undated) DEVICE — SYR 10ML CTRL LR LCK NSAF LF --

## (undated) DEVICE — SUTURE MCRYL SZ 4-0 L27IN ABSRB UD L19MM PS-2 1/2 CIR PRIM Y426H

## (undated) DEVICE — APPLIER LIG CLP 5MM CONTAIN 16 TI CLP DISP ENDO CLP

## (undated) DEVICE — SUTURE PDS II SZ 0 L27IN ABSRB VLT L26MM CT-2 1/2 CIR Z334H

## (undated) DEVICE — UNIVERSAL FIXATION CANNULA: Brand: VERSAONE

## (undated) DEVICE — SURGICAL PROCEDURE KIT GEN LAPAROSCOPY LF

## (undated) DEVICE — NEEDLE HYPO 22GA L1.5IN BLK S STL HUB POLYPR SHLD REG BVL

## (undated) DEVICE — UNIVERSAL STAPLER: Brand: ENDO GIA ULTRA

## (undated) DEVICE — DERMABOND SKIN ADH 0.7ML -- DERMABOND ADVANCED 12/BX

## (undated) DEVICE — BLADELESS OPTICAL TROCAR WITH FIXATION CANNULA: Brand: VERSAPORT

## (undated) DEVICE — 3000CC GUARDIAN II: Brand: GUARDIAN

## (undated) DEVICE — STERILE POLYISOPRENE POWDER-FREE SURGICAL GLOVES WITH EMOLLIENT COATING: Brand: PROTEXIS

## (undated) DEVICE — KENDALL SCD EXPRESS SLEEVES, KNEE LENGTH, MEDIUM: Brand: KENDALL SCD

## (undated) DEVICE — BAG SPEC REM 224ML W4XL6IN DIA10MM 1 HND GYN DISP ENDOPCH

## (undated) DEVICE — SOL IRRIGATION INJ NACL 0.9% 500ML BTL

## (undated) DEVICE — COVER LT HNDL BLU PLAS

## (undated) DEVICE — REM POLYHESIVE ADULT PATIENT RETURN ELECTRODE: Brand: VALLEYLAB